# Patient Record
Sex: MALE | Race: WHITE | NOT HISPANIC OR LATINO | Employment: FULL TIME | URBAN - METROPOLITAN AREA
[De-identification: names, ages, dates, MRNs, and addresses within clinical notes are randomized per-mention and may not be internally consistent; named-entity substitution may affect disease eponyms.]

---

## 2017-02-12 ENCOUNTER — APPOINTMENT (EMERGENCY)
Dept: RADIOLOGY | Facility: HOSPITAL | Age: 62
End: 2017-02-12
Payer: COMMERCIAL

## 2017-02-12 ENCOUNTER — HOSPITAL ENCOUNTER (EMERGENCY)
Facility: HOSPITAL | Age: 62
Discharge: HOME | End: 2017-02-12
Attending: EMERGENCY MEDICINE
Payer: COMMERCIAL

## 2017-02-12 VITALS
HEIGHT: 71 IN | RESPIRATION RATE: 18 BRPM | TEMPERATURE: 98.4 F | OXYGEN SATURATION: 94 % | HEART RATE: 71 BPM | DIASTOLIC BLOOD PRESSURE: 77 MMHG | BODY MASS INDEX: 37.8 KG/M2 | WEIGHT: 270 LBS | SYSTOLIC BLOOD PRESSURE: 150 MMHG

## 2017-02-12 DIAGNOSIS — M75.20 BICEPS TENDONITIS: Primary | ICD-10-CM

## 2017-02-12 PROCEDURE — 73030 X-RAY EXAM OF SHOULDER: CPT

## 2017-02-12 PROCEDURE — 99283 EMERGENCY DEPT VISIT LOW MDM: CPT

## 2017-02-12 RX ORDER — PREDNISONE 20 MG/1
60 TABLET ORAL DAILY
Qty: 15 TABLET | Refills: 0 | Status: SHIPPED | OUTPATIENT
Start: 2017-02-12 | End: 2017-02-17

## 2017-02-12 RX ORDER — METOPROLOL SUCCINATE 100 MG/1
100 TABLET, EXTENDED RELEASE ORAL DAILY
COMMUNITY
End: 2019-06-06 | Stop reason: ALTCHOICE

## 2017-02-12 RX ORDER — METHOCARBAMOL 750 MG/1
750 TABLET, FILM COATED ORAL 3 TIMES DAILY
Qty: 15 TABLET | Refills: 0 | Status: SHIPPED | OUTPATIENT
Start: 2017-02-12 | End: 2019-06-06 | Stop reason: ALTCHOICE

## 2017-02-12 RX ORDER — PREDNISONE 20 MG/1
60 TABLET ORAL ONCE
Status: DISCONTINUED | OUTPATIENT
Start: 2017-02-12 | End: 2017-02-12 | Stop reason: HOSPADM

## 2019-05-03 RX ORDER — DIPHENOXYLATE HYDROCHLORIDE AND ATROPINE SULFATE 2.5; .025 MG/1; MG/1
1 TABLET ORAL DAILY
COMMUNITY

## 2019-05-03 RX ORDER — METOPROLOL TARTRATE 50 MG/1
TABLET, FILM COATED ORAL
COMMUNITY
End: 2019-06-06 | Stop reason: ALTCHOICE

## 2019-05-03 RX ORDER — CETIRIZINE HYDROCHLORIDE 10 MG/1
10 TABLET ORAL DAILY
COMMUNITY

## 2019-05-03 RX ORDER — LOSARTAN POTASSIUM 50 MG/1
50 TABLET ORAL
COMMUNITY
Start: 2019-03-25

## 2019-05-03 RX ORDER — KETOCONAZOLE 20 MG/ML
SHAMPOO TOPICAL
COMMUNITY

## 2019-05-03 RX ORDER — BUDESONIDE AND FORMOTEROL FUMARATE DIHYDRATE 80; 4.5 UG/1; UG/1
2 AEROSOL RESPIRATORY (INHALATION) 2 TIMES DAILY
COMMUNITY
End: 2019-06-06 | Stop reason: ALTCHOICE

## 2019-05-08 ENCOUNTER — OFFICE VISIT (OUTPATIENT)
Dept: GASTROENTEROLOGY | Facility: CLINIC | Age: 64
End: 2019-05-08
Payer: COMMERCIAL

## 2019-05-08 VITALS
BODY MASS INDEX: 35 KG/M2 | HEART RATE: 70 BPM | WEIGHT: 250 LBS | SYSTOLIC BLOOD PRESSURE: 118 MMHG | HEIGHT: 71 IN | DIASTOLIC BLOOD PRESSURE: 80 MMHG

## 2019-05-08 DIAGNOSIS — R10.10 PAIN OF UPPER ABDOMEN: Primary | ICD-10-CM

## 2019-05-08 PROCEDURE — 99214 OFFICE O/P EST MOD 30 MIN: CPT | Performed by: INTERNAL MEDICINE

## 2019-06-05 ENCOUNTER — ANESTHESIA EVENT (OUTPATIENT)
Dept: GASTROENTEROLOGY | Facility: HOSPITAL | Age: 64
End: 2019-06-05

## 2019-06-06 ENCOUNTER — ANESTHESIA (OUTPATIENT)
Dept: GASTROENTEROLOGY | Facility: HOSPITAL | Age: 64
End: 2019-06-06

## 2019-06-06 ENCOUNTER — HOSPITAL ENCOUNTER (OUTPATIENT)
Dept: GASTROENTEROLOGY | Facility: HOSPITAL | Age: 64
Setting detail: OUTPATIENT SURGERY
Discharge: HOME | End: 2019-06-06
Attending: INTERNAL MEDICINE | Admitting: INTERNAL MEDICINE
Payer: COMMERCIAL

## 2019-06-06 VITALS
HEIGHT: 71 IN | HEART RATE: 73 BPM | BODY MASS INDEX: 34.63 KG/M2 | SYSTOLIC BLOOD PRESSURE: 119 MMHG | RESPIRATION RATE: 17 BRPM | WEIGHT: 247.36 LBS | TEMPERATURE: 98.6 F | DIASTOLIC BLOOD PRESSURE: 76 MMHG | OXYGEN SATURATION: 96 %

## 2019-06-06 DIAGNOSIS — R10.13 DYSPEPSIA: ICD-10-CM

## 2019-06-06 PROCEDURE — 43235 EGD DIAGNOSTIC BRUSH WASH: CPT | Performed by: INTERNAL MEDICINE

## 2019-06-06 RX ORDER — LIDOCAINE HYDROCHLORIDE 10 MG/ML
INJECTION, SOLUTION INFILTRATION; PERINEURAL AS NEEDED
Status: DISCONTINUED | OUTPATIENT
Start: 2019-06-06 | End: 2019-06-06 | Stop reason: SURG

## 2019-06-06 RX ORDER — SODIUM CHLORIDE, SODIUM LACTATE, POTASSIUM CHLORIDE, CALCIUM CHLORIDE 600; 310; 30; 20 MG/100ML; MG/100ML; MG/100ML; MG/100ML
INJECTION, SOLUTION INTRAVENOUS CONTINUOUS PRN
Status: DISCONTINUED | OUTPATIENT
Start: 2019-06-06 | End: 2019-06-06 | Stop reason: SURG

## 2019-06-06 RX ORDER — SODIUM CHLORIDE, SODIUM LACTATE, POTASSIUM CHLORIDE, CALCIUM CHLORIDE 600; 310; 30; 20 MG/100ML; MG/100ML; MG/100ML; MG/100ML
125 INJECTION, SOLUTION INTRAVENOUS CONTINUOUS
Status: DISCONTINUED | OUTPATIENT
Start: 2019-06-06 | End: 2019-06-10 | Stop reason: HOSPADM

## 2019-06-06 RX ORDER — PROPOFOL 10 MG/ML
INJECTION, EMULSION INTRAVENOUS AS NEEDED
Status: DISCONTINUED | OUTPATIENT
Start: 2019-06-06 | End: 2019-06-06 | Stop reason: SURG

## 2019-06-06 RX ADMIN — PROPOFOL 100 MG: 10 INJECTION, EMULSION INTRAVENOUS at 11:16

## 2019-06-06 RX ADMIN — PROPOFOL 50 MG: 10 INJECTION, EMULSION INTRAVENOUS at 11:17

## 2019-06-06 RX ADMIN — SODIUM CHLORIDE, SODIUM LACTATE, POTASSIUM CHLORIDE, AND CALCIUM CHLORIDE: .6; .31; .03; .02 INJECTION, SOLUTION INTRAVENOUS at 10:54

## 2019-06-06 RX ADMIN — PROPOFOL 20 MG: 10 INJECTION, EMULSION INTRAVENOUS at 11:21

## 2019-06-06 RX ADMIN — PROPOFOL 30 MG: 10 INJECTION, EMULSION INTRAVENOUS at 11:18

## 2019-06-06 RX ADMIN — SODIUM CHLORIDE, SODIUM LACTATE, POTASSIUM CHLORIDE, AND CALCIUM CHLORIDE 125 ML/HR: .6; .31; .03; .02 INJECTION, SOLUTION INTRAVENOUS at 10:53

## 2019-06-06 RX ADMIN — LIDOCAINE HYDROCHLORIDE 50 MG: 10 INJECTION, SOLUTION INFILTRATION; PERINEURAL at 11:16

## 2019-09-15 ENCOUNTER — APPOINTMENT (EMERGENCY)
Dept: CT IMAGING | Facility: HOSPITAL | Age: 64
End: 2019-09-15
Payer: COMMERCIAL

## 2019-09-15 ENCOUNTER — HOSPITAL ENCOUNTER (EMERGENCY)
Facility: HOSPITAL | Age: 64
Discharge: HOME | End: 2019-09-15
Attending: EMERGENCY MEDICINE | Admitting: EMERGENCY MEDICINE
Payer: COMMERCIAL

## 2019-09-15 VITALS
WEIGHT: 250.66 LBS | TEMPERATURE: 98.3 F | HEIGHT: 71 IN | BODY MASS INDEX: 35.09 KG/M2 | RESPIRATION RATE: 18 BRPM | HEART RATE: 94 BPM | DIASTOLIC BLOOD PRESSURE: 76 MMHG | OXYGEN SATURATION: 94 % | SYSTOLIC BLOOD PRESSURE: 151 MMHG

## 2019-09-15 DIAGNOSIS — R10.9 ABDOMINAL PAIN: Primary | ICD-10-CM

## 2019-09-15 LAB
ALBUMIN SERPL BCP-MCNC: 3.5 G/DL (ref 3.5–5)
ALP SERPL-CCNC: 89 U/L (ref 46–116)
ALT SERPL W P-5'-P-CCNC: 65 U/L (ref 12–78)
ANION GAP SERPL CALCULATED.3IONS-SCNC: 7 MMOL/L (ref 4–13)
AST SERPL W P-5'-P-CCNC: 68 U/L (ref 5–45)
BASOPHILS # BLD AUTO: 0.05 THOUSANDS/ΜL (ref 0–0.1)
BASOPHILS NFR BLD AUTO: 1 % (ref 0–1)
BILIRUB SERPL-MCNC: 1.1 MG/DL (ref 0.2–1)
BILIRUB UR QL STRIP: NEGATIVE
BUN SERPL-MCNC: 13 MG/DL (ref 5–25)
CALCIUM SERPL-MCNC: 8.6 MG/DL (ref 8.3–10.1)
CHLORIDE SERPL-SCNC: 103 MMOL/L (ref 100–108)
CLARITY UR: CLEAR
CO2 SERPL-SCNC: 25 MMOL/L (ref 21–32)
COLOR UR: YELLOW
CREAT SERPL-MCNC: 0.8 MG/DL (ref 0.6–1.3)
EOSINOPHIL # BLD AUTO: 0.13 THOUSAND/ΜL (ref 0–0.61)
EOSINOPHIL NFR BLD AUTO: 2 % (ref 0–6)
ERYTHROCYTE [DISTWIDTH] IN BLOOD BY AUTOMATED COUNT: 12 % (ref 11.6–15.1)
GFR SERPL CREATININE-BSD FRML MDRD: 95 ML/MIN/1.73SQ M
GLUCOSE SERPL-MCNC: 140 MG/DL (ref 65–140)
GLUCOSE UR STRIP-MCNC: NEGATIVE MG/DL
HCT VFR BLD AUTO: 47.3 % (ref 36.5–49.3)
HGB BLD-MCNC: 16.3 G/DL (ref 12–17)
HGB UR QL STRIP.AUTO: NEGATIVE
HOLD SPECIMEN: NORMAL
IMM GRANULOCYTES # BLD AUTO: 0.03 THOUSAND/UL (ref 0–0.2)
IMM GRANULOCYTES NFR BLD AUTO: 0 % (ref 0–2)
KETONES UR STRIP-MCNC: NEGATIVE MG/DL
LEUKOCYTE ESTERASE UR QL STRIP: NEGATIVE
LIPASE SERPL-CCNC: 61 U/L (ref 73–393)
LYMPHOCYTES # BLD AUTO: 2.06 THOUSANDS/ΜL (ref 0.6–4.47)
LYMPHOCYTES NFR BLD AUTO: 25 % (ref 14–44)
MCH RBC QN AUTO: 30.4 PG (ref 26.8–34.3)
MCHC RBC AUTO-ENTMCNC: 34.5 G/DL (ref 31.4–37.4)
MCV RBC AUTO: 88 FL (ref 82–98)
MONOCYTES # BLD AUTO: 0.82 THOUSAND/ΜL (ref 0.17–1.22)
MONOCYTES NFR BLD AUTO: 10 % (ref 4–12)
NEUTROPHILS # BLD AUTO: 5.1 THOUSANDS/ΜL (ref 1.85–7.62)
NEUTS SEG NFR BLD AUTO: 62 % (ref 43–75)
NITRITE UR QL STRIP: NEGATIVE
NRBC BLD AUTO-RTO: 0 /100 WBCS
PH UR STRIP.AUTO: 6 [PH]
PLATELET # BLD AUTO: 189 THOUSANDS/UL (ref 149–390)
PMV BLD AUTO: 10.6 FL (ref 8.9–12.7)
POTASSIUM SERPL-SCNC: 5.1 MMOL/L (ref 3.5–5.3)
PROT SERPL-MCNC: 6.9 G/DL (ref 6.4–8.2)
PROT UR STRIP-MCNC: NEGATIVE MG/DL
RBC # BLD AUTO: 5.37 MILLION/UL (ref 3.88–5.62)
SODIUM SERPL-SCNC: 135 MMOL/L (ref 136–145)
SP GR UR STRIP.AUTO: 1.01 (ref 1–1.03)
UROBILINOGEN UR QL STRIP.AUTO: 0.2 E.U./DL
WBC # BLD AUTO: 8.19 THOUSAND/UL (ref 4.31–10.16)

## 2019-09-15 PROCEDURE — 80053 COMPREHEN METABOLIC PANEL: CPT | Performed by: EMERGENCY MEDICINE

## 2019-09-15 PROCEDURE — 99284 EMERGENCY DEPT VISIT MOD MDM: CPT | Performed by: EMERGENCY MEDICINE

## 2019-09-15 PROCEDURE — 36415 COLL VENOUS BLD VENIPUNCTURE: CPT | Performed by: EMERGENCY MEDICINE

## 2019-09-15 PROCEDURE — 99284 EMERGENCY DEPT VISIT MOD MDM: CPT

## 2019-09-15 PROCEDURE — 85025 COMPLETE CBC W/AUTO DIFF WBC: CPT | Performed by: EMERGENCY MEDICINE

## 2019-09-15 PROCEDURE — 74176 CT ABD & PELVIS W/O CONTRAST: CPT

## 2019-09-15 PROCEDURE — 83690 ASSAY OF LIPASE: CPT | Performed by: EMERGENCY MEDICINE

## 2019-09-15 PROCEDURE — 81003 URINALYSIS AUTO W/O SCOPE: CPT | Performed by: EMERGENCY MEDICINE

## 2019-09-15 RX ORDER — MAGNESIUM HYDROXIDE/ALUMINUM HYDROXICE/SIMETHICONE 120; 1200; 1200 MG/30ML; MG/30ML; MG/30ML
30 SUSPENSION ORAL ONCE
Status: COMPLETED | OUTPATIENT
Start: 2019-09-15 | End: 2019-09-15

## 2019-09-15 RX ORDER — FAMOTIDINE 40 MG/1
40 TABLET, FILM COATED ORAL DAILY
Qty: 21 TABLET | Refills: 0 | Status: SHIPPED | OUTPATIENT
Start: 2019-09-15 | End: 2021-03-24

## 2019-09-15 RX ORDER — LIDOCAINE HYDROCHLORIDE 20 MG/ML
15 SOLUTION OROPHARYNGEAL ONCE
Status: COMPLETED | OUTPATIENT
Start: 2019-09-15 | End: 2019-09-15

## 2019-09-15 RX ORDER — ONDANSETRON 4 MG/1
4 TABLET, ORALLY DISINTEGRATING ORAL EVERY 8 HOURS PRN
Qty: 12 TABLET | Refills: 0 | Status: SHIPPED | OUTPATIENT
Start: 2019-09-15 | End: 2021-03-24

## 2019-09-15 RX ADMIN — ALUMINUM HYDROXIDE, MAGNESIUM HYDROXIDE, AND SIMETHICONE 30 ML: 200; 200; 20 SUSPENSION ORAL at 15:15

## 2019-09-15 RX ADMIN — IOHEXOL 50 ML: 240 INJECTION, SOLUTION INTRATHECAL; INTRAVASCULAR; INTRAVENOUS; ORAL at 16:52

## 2019-09-15 RX ADMIN — LIDOCAINE HYDROCHLORIDE 15 ML: 20 SOLUTION ORAL; TOPICAL at 15:15

## 2019-09-15 NOTE — DISCHARGE INSTRUCTIONS
Please return immediately if your symptoms return or suddenly worsening and do not go away you developed other concerning symptoms otherwise please follow up your family doctor for re-evaluation as instructed as discussed

## 2019-09-15 NOTE — ED PROVIDER NOTES
History  Chief Complaint   Patient presents with    Abdominal Pain     mostly left sided, onset yesterday     81-year-old male with a history of pancreatic mass status post resection and Whipple 2 years ago here for evaluation of abdominal pain  Patient reports approximately 14 hours ago he developed burning pain in a discomfort in his left lower quadrant is nonradiating he notes that he believes is worse with food, has been waxing and waning since last night he is here for further evaluation of the symptoms  He denies history of similar they are area of concern is left lower quadrant only is not his left upper quadrant, he reports that it is improved with water animal is gone at this point but is still persists again no other exacerbating remitting factors he has no other associated symptoms with this including recent fevers viral symptoms headache chest pain cough shortness of breath he has no nausea vomiting anorexia no flank pain urinary symptoms he notes no change in bowels joint pain swelling rashes other associated symptoms  Complete review systems otherwise negative as noted          Prior to Admission Medications   Prescriptions Last Dose Informant Patient Reported? Taking?    BIOTIN PO  Self Yes No   Sig: Take by mouth   NIACINAMIDE ER PO  Self Yes No   Sig: Take 1,500 mg by mouth daily   Omega-3 Fatty Acids (FISH OIL OMEGA-3 PO)  Self Yes No   Sig: Take 1 g by mouth daily   aspirin 81 MG tablet  Self Yes No   Sig: Take 81 mg by mouth daily   cetirizine (ZyrTEC) 10 mg tablet  Self Yes No   Sig: Take 10 mg by mouth daily   diphenhydrAMINE-APAP, sleep, (DIPHENHYDRAMINE-APAP PO)  Self Yes No   Sig: Take 1 capsule by mouth 1 hour before exam    hydrocortisone 2 5 % cream  Self Yes No   Sig: hydrocortisone 2 5 % topical cream   ketoconazole (NIZORAL) 2 % shampoo  Self Yes No   Sig: ketoconazole 2 % shampoo   losartan (COZAAR) 50 mg tablet  Self Yes No   Sig: Take 50 mg by mouth   multivitamin (THERAGRAN) TABS  Self Yes No   Sig: Take 1 tablet by mouth daily   pancrelipase, Lip-Prot-Amyl, (CREON) 24,000 units  Self Yes No   Sig: TAKE 2 CAPSULES BY MOUTH 3 (THREE) TIMES A DAY WITH MEALS  Facility-Administered Medications: None       Past Medical History:   Diagnosis Date    Hypertension        Past Surgical History:   Procedure Laterality Date    ABDOMINAL SURGERY      APPENDECTOMY      COLONOSCOPY      2018    FACIAL RECONSTRUCTION SURGERY      OTHER SURGICAL HISTORY      skin cancer removal    TONSILLECTOMY      WHIPPLE PROCEDURE W/ LAPAROSCOPY         Family History   Problem Relation Age of Onset    Hypertension Mother     Heart disease Mother     Osteoarthritis Mother     Cancer Father     Throat cancer Father      I have reviewed and agree with the history as documented  Social History     Tobacco Use    Smoking status: Former Smoker    Smokeless tobacco: Never Used    Tobacco comment: quit 25 years ago   Substance Use Topics    Alcohol use: No    Drug use: No        Review of Systems   Constitutional: Negative for activity change, appetite change, chills and fever  HENT: Negative for rhinorrhea and sore throat  Respiratory: Negative for cough and shortness of breath  Cardiovascular: Negative for chest pain and palpitations  Gastrointestinal: Positive for abdominal pain  Negative for abdominal distention, constipation, diarrhea, nausea, rectal pain and vomiting  Genitourinary: Negative for dysuria, frequency, scrotal swelling, testicular pain and urgency  Musculoskeletal: Negative for arthralgias, back pain and myalgias  Skin: Negative for color change and rash  Neurological: Negative for dizziness, weakness, light-headedness and headaches  Hematological: Negative for adenopathy  Does not bruise/bleed easily  Psychiatric/Behavioral: Negative for agitation and behavioral problems  All other systems reviewed and are negative        Physical Exam  Physical Exam Constitutional: He is oriented to person, place, and time  He appears well-developed and well-nourished  No distress  Clinically very well-appearing pleasant conversational no acute distress   HENT:   Head: Normocephalic and atraumatic  Eyes: Pupils are equal, round, and reactive to light  EOM are normal    Neck: Normal range of motion  Neck supple  No tracheal deviation present  Cardiovascular: Normal rate, regular rhythm and normal heart sounds  Exam reveals no gallop and no friction rub  No murmur heard  Pulmonary/Chest: Effort normal and breath sounds normal  He has no wheezes  He has no rales  Abdominal: Soft  Bowel sounds are normal  He exhibits no distension  There is tenderness  There is no rebound and no guarding  Minimal tenderness in his left mid abdomen no rebound or guarding no discrete lower abdominal tenderness no upper abdominal tenderness his no flank or CVA tenderness   Musculoskeletal: Normal range of motion  He exhibits no edema or tenderness  Neurological: He is alert and oriented to person, place, and time  No cranial nerve deficit  He exhibits normal muscle tone  Coordination normal    Skin: Skin is warm and dry  No rash noted  Psychiatric: He has a normal mood and affect  His behavior is normal    Nursing note and vitals reviewed        Vital Signs  ED Triage Vitals   Temperature Pulse Respirations Blood Pressure SpO2   09/15/19 1426 09/15/19 1426 09/15/19 1426 09/15/19 1426 09/15/19 1426   98 3 °F (36 8 °C) 83 16 144/86 99 %      Temp Source Heart Rate Source Patient Position - Orthostatic VS BP Location FiO2 (%)   09/15/19 1426 09/15/19 1426 09/15/19 1646 09/15/19 1426 --   Oral Monitor Lying Right arm       Pain Score       09/15/19 1426       Worst Possible Pain           Vitals:    09/15/19 1426 09/15/19 1646 09/15/19 1826   BP: 144/86 126/90 151/76   Pulse: 83 75 94   Patient Position - Orthostatic VS:  Lying          Visual Acuity      ED Medications  Medications aluminum-magnesium hydroxide-simethicone (MYLANTA) 200-200-20 mg/5 mL oral suspension 30 mL (30 mL Oral Given 9/15/19 1515)   Lidocaine Viscous HCl (XYLOCAINE) 2 % mucosal solution 15 mL (15 mL Swish & Spit Given 9/15/19 1515)   iohexol (OMNIPAQUE) 240 MG/ML solution 50 mL (50 mL Oral Given 9/15/19 1652)       Diagnostic Studies  Results Reviewed     Procedure Component Value Units Date/Time    Comprehensive metabolic panel [[id number]]  (Abnormal) Collected:  09/15/19 1510    Lab Status:  Final result Specimen:  Blood from Arm, Right Updated:  09/15/19 1545     Sodium 135 mmol/L      Potassium 5 1 mmol/L      Chloride 103 mmol/L      CO2 25 mmol/L      ANION GAP 7 mmol/L      BUN 13 mg/dL      Creatinine 0 80 mg/dL      Glucose 140 mg/dL      Calcium 8 6 mg/dL      AST 68 U/L      ALT 65 U/L      Alkaline Phosphatase 89 U/L      Total Protein 6 9 g/dL      Albumin 3 5 g/dL      Total Bilirubin 1 10 mg/dL      eGFR 95 ml/min/1 73sq m     Narrative:       [place] guidelines for Chronic Kidney Disease (CKD):     Stage 1 with normal or high GFR (GFR > 90 mL/min/1 73 square meters)    Stage 2 Mild CKD (GFR = 60-89 mL/min/1 73 square meters)    Stage 3A Moderate CKD (GFR = 45-59 mL/min/1 73 square meters)    Stage 3B Moderate CKD (GFR = 30-44 mL/min/1 73 square meters)    Stage 4 Severe CKD (GFR = 15-29 mL/min/1 73 square meters)    Stage 5 End Stage CKD (GFR <15 mL/min/1 73 square meters)  Note: GFR calculation is accurate only with a steady state creatinine    Lipase [[id number]]  (Abnormal) Collected:  09/15/19 1510    Lab Status:  Final result Specimen:  Blood from Arm, Right Updated:  09/15/19 1545     Lipase 61 u/L     UA w Reflex to Microscopic w Reflex to Culture [[id number]] Collected:  09/15/19 1530    Lab Status:  Final result Specimen:  Urine, Clean Catch Updated:  09/15/19 1541     Color, UA Yellow     Clarity, UA Clear     Specific Gravity, UA 1 010     pH, UA 6 0 Leukocytes, UA Negative     Nitrite, UA Negative     Protein, UA Negative mg/dl      Glucose, UA Negative mg/dl      Ketones, UA Negative mg/dl      Urobilinogen, UA 0 2 E U /dl      Bilirubin, UA Negative     Blood, UA Negative    CBC and differential [[id number]] Collected:  09/15/19 1510    Lab Status:  Final result Specimen:  Blood from Arm, Right Updated:  09/15/19 1520     WBC 8 19 Thousand/uL      RBC 5 37 Million/uL      Hemoglobin 16 3 g/dL      Hematocrit 47 3 %      MCV 88 fL      MCH 30 4 pg      MCHC 34 5 g/dL      RDW 12 0 %      MPV 10 6 fL      Platelets 029 Thousands/uL      nRBC 0 /100 WBCs      Neutrophils Relative 62 %      Immat GRANS % 0 %      Lymphocytes Relative 25 %      Monocytes Relative 10 %      Eosinophils Relative 2 %      Basophils Relative 1 %      Neutrophils Absolute 5 10 Thousands/µL      Immature Grans Absolute 0 03 Thousand/uL      Lymphocytes Absolute 2 06 Thousands/µL      Monocytes Absolute 0 82 Thousand/µL      Eosinophils Absolute 0 13 Thousand/µL      Basophils Absolute 0 05 Thousands/µL                  CT abdomen pelvis wo contrast   Final Result by Mynor Kauffman MD (09/15 1746)      No acute abnormality to account for left lower quadrant pain  Colonic diverticulosis without evidence of acute diverticulitis  Hepatic steatosis  Postoperative changes status post Whipple procedure with expected mild pneumobilia  Main pancreatic duct is dilated and contains significant amount of air which is presumed to be postoperative  Correlation with prior outside studies will be helpful                 Workstation performed: [id number]                    Procedures  Procedures       ED Course  ED Course as of Sep 15 2144   Sanaz Fox Sep 15, 2019   1809 AST(!): 68   1814 Patient's symptoms resolved without return of symptoms, after further discussion was made better by water and drinking, there are no other suspicious findings on his CT scan he does have some postoperative changes without anything to compare to he has left lower quadrant discomfort now resolved there is no right upper quadrant pain or upper abdominal pain or anything in the area of his Whipple his LFTs are not significantly elevated reviewed with patient family will discharge with symptom control very close return follow-up instructions patient family agreeable plan                                  MDM    Disposition  Final diagnoses:   Abdominal pain     Time reflects when diagnosis was documented in both MDM as applicable and the Disposition within this note     Time User Action Codes Description Comment    9/15/2019  6:15 PM Talib Mccullough Add [R10 9] Abdominal pain       ED Disposition     ED Disposition Condition Date/Time Comment    Discharge Stable Sun Sep 15, 2019  6:15 PM [place] discharge to home/self care              Follow-up Information     Follow up With Specialties Details Why Contact Info Additional Information    [address] [place] [address] Emergency Department Emergency Medicine  If symptoms worsen [facility] [address] ED, [address], [place], [address] [place] [address], [address] [place] [address] Nurse Practitioner, Family Medicine In 1 week  [address] [place] [address]  [phone]             Discharge Medication List as of 9/15/2019  6:17 PM      START taking these medications    Details   famotidine (PEPCID) 40 MG tablet Take 1 tablet (40 mg total) by mouth daily for 21 days, Starting Sun 9/15/2019, Until Sun 10/6/2019, Print      ondansetron (ZOFRAN-ODT) 4 mg disintegrating tablet Take 1 tablet (4 mg total) by mouth every 8 (eight) hours as needed for nausea for up to 3 days, Starting Sun 9/15/2019, Until Wed 9/18/2019, Print         CONTINUE these medications which have NOT CHANGED    Details   aspirin 81 MG tablet Take 81 mg by mouth daily, Historical Med      BIOTIN PO Take by mouth, Historical Med      cetirizine (ZyrTEC) 10 mg tablet Take 10 mg by mouth daily, Historical Med      diphenhydrAMINE-APAP, sleep, (DIPHENHYDRAMINE-APAP PO) Take 1 capsule by mouth 1 hour before exam , Historical Med      hydrocortisone 2 5 % cream hydrocortisone 2 5 % topical cream, Historical Med      ketoconazole (NIZORAL) 2 % shampoo ketoconazole 2 % shampoo, Historical Med      losartan (COZAAR) 50 mg tablet Take 50 mg by mouth, Starting Mon 3/25/2019, Historical Med      multivitamin (THERAGRAN) TABS Take 1 tablet by mouth daily, Historical Med      NIACINAMIDE ER PO Take 1,500 mg by mouth daily, Historical Med      Omega-3 Fatty Acids (FISH OIL OMEGA-3 PO) Take 1 g by mouth daily, Starting Mon 4/15/2019, Until Tue 4/14/2020, Historical Med      pancrelipase, Lip-Prot-Amyl, (CREON) 24,000 units TAKE 2 CAPSULES BY MOUTH 3 (THREE) TIMES A DAY WITH MEALS , Historical Med           No discharge procedures on file      ED Provider  Electronically Signed by           Srekeanth Cunningham DO  09/15/19 8375

## 2020-01-16 NOTE — PROGRESS NOTES
Assessment and plan:       1  BPH with lower urinary tract symptoms  - Patient demonstrates a slightly elevated PVR of 59 mL today  - AUA symptom score is 22 with an overall bother score of 5    - We discussed pharmacotherapy as well as bladder outlet obstruction procedures at his appointment today  He was given patient education documentation on the Urolift procedure but would like to start with pharmacotherapy which is very reasonable  - He is provided with a prescription of tamsulosin  Dosing instructions and side effect profile were reviewed  2  Erectile dysfunction  - Patient was provided with a prescription of 20 mg sildenafil  He was educated that he can take up to 5 tabs but due to the fact that he is able to achieve some erectile function on his own, he may not need this much  - Side effect profile and emergency department precautions were reviewed  3  Prostate cancer screening  - Recent PSA was 0 61  - Prostate examination today was benign    He will return approximately 6 weeks for symptom reassessment  Lisseth Holloway PA-C      Chief Complaint     Chief Complaint   Patient presents with    Urinary Incontinence         History of Present Illness     Ce Keenan is a 62 y o  male patient re-establishing care with [facility] for Urology for urinary incontinence  He was last seen by our service on 09/22/2016 by Dr Serjio Marie  At that time he has suffered from a single episode of uncomplicated urethritis with spontaneous resolution  Patient is not on any medications for his prostate or bladder  He denies any family history of prostate issues or prostate cancer  Patient reports that he does not utilize a pad for an incontinence but often has to rush to the bathroom and barely makes it  He did have dribbling more recently prior to reaching the restroom  He also reports some erectile dysfunction    He does continue to have occasional morning erections but does have trouble both achieving and maintaining an erection  He has noted some burning with ejaculation  He is concerned about a decreased amount of ejaculate as well  He is not currently trying to conceive children  He does report 5-6 episodes of "terrible" perineal pain lasting 5-6 minutes at a time over the last 12 months  He did have a PSA drawn 01/09/2020 that was 0 61  He shows slightly elevated PVR today of 59 mL  AUA symptom score is 22 with an overall bother score of 5  Laboratory     Lab Results   Component Value Date    CREATININE 0 80 09/15/2019       No results found for: PSA    Recent Results (from the past 1 hour(s))   POCT Measure PVR    Collection Time: 01/21/20  1:23 PM   Result Value Ref Range    POST-VOID RESIDUAL VOLUME, ML POC 59 mL         Review of Systems     Review of Systems   Constitutional: Negative for chills and fever  HENT: Negative  Eyes: Negative  Respiratory: Negative for shortness of breath  Cardiovascular: Negative for chest pain  Gastrointestinal: Negative for constipation, diarrhea, nausea and vomiting  Genitourinary: Positive for frequency and urgency  Negative for difficulty urinating, dysuria, enuresis, flank pain and hematuria  Musculoskeletal: Negative  AUA SYMPTOM SCORE      Most Recent Value   AUA SYMPTOM SCORE   How often have you had a sensation of not emptying your bladder completely after you finished urinating? 1   How often have you had to urinate again less than two hours after you finished urinating? 5   How often have you found you stopped and started again several times when you urinate? 4   How often have you found it difficult to postpone urination? 4   How often have you had a weak urinary stream?  3   How often have you had to push or strain to begin urination? 4   How many times did you most typically get up to urinate from the time you went to bed at night until the time you got up in the morning?   1   Quality of Life: If you were to spend the rest of your life with your urinary condition just the way it is now, how would you feel about that?  5   AUA SYMPTOM SCORE  22                    Allergies     Allergies   Allergen Reactions    Iodinated Casein Hives and Itching    Iodine Hives    Shellfish-Derived Products Hives and Anaphylaxis    Glucosamine Hives    Penicillins Other (See Comments)     Rt  Shoulder was frozen       Physical Exam     Physical Exam   Constitutional: He is oriented to person, place, and time  He appears well-developed and well-nourished  No distress  HENT:   Head: Normocephalic and atraumatic  Right Ear: External ear normal    Left Ear: External ear normal    Nose: Nose normal    Eyes: Right eye exhibits no discharge  Left eye exhibits no discharge  No scleral icterus  Cardiovascular: Normal rate  Pulmonary/Chest: Effort normal    Genitourinary:   Genitourinary Comments: Prostate reveals approximately 25 g gland that is smooth, nontender, and without nodules  Musculoskeletal:   Ambulates independently   Neurological: He is alert and oriented to person, place, and time  Skin: Skin is warm and dry  He is not diaphoretic  Psychiatric: He has a normal mood and affect  His behavior is normal  Judgment and thought content normal    Nursing note and vitals reviewed        Vital Signs     Vitals:    01/21/20 1316   BP: 140/90   BP Location: Right arm   Patient Position: Sitting   Cuff Size: Standard   Pulse: 105   Weight: 113 kg (250 lb)   Height: 5' 11" (1 803 m)         Current Medications       Current Outpatient Medications:     aspirin 81 MG tablet, Take 81 mg by mouth daily, Disp: , Rfl:     BIOTIN PO, Take by mouth, Disp: , Rfl:     cetirizine (ZyrTEC) 10 mg tablet, Take 10 mg by mouth daily, Disp: , Rfl:     diphenhydrAMINE-APAP, sleep, (DIPHENHYDRAMINE-APAP PO), Take 1 capsule by mouth 1 hour before exam , Disp: , Rfl:     famotidine (PEPCID) 40 MG tablet, Take 1 tablet (40 mg total) by mouth daily for 21 days, Disp: 21 tablet, Rfl: 0    hydrocortisone 2 5 % cream, hydrocortisone 2 5 % topical cream, Disp: , Rfl:     ketoconazole (NIZORAL) 2 % shampoo, ketoconazole 2 % shampoo, Disp: , Rfl:     losartan (COZAAR) 50 mg tablet, Take 50 mg by mouth, Disp: , Rfl:     multivitamin (THERAGRAN) TABS, Take 1 tablet by mouth daily, Disp: , Rfl:     NIACINAMIDE ER PO, Take 1,500 mg by mouth daily, Disp: , Rfl:     Omega-3 Fatty Acids (FISH OIL OMEGA-3 PO), Take 1 g by mouth daily, Disp: , Rfl:     ondansetron (ZOFRAN-ODT) 4 mg disintegrating tablet, Take 1 tablet (4 mg total) by mouth every 8 (eight) hours as needed for nausea for up to 3 days, Disp: 12 tablet, Rfl: 0    pancrelipase, Lip-Prot-Amyl, (CREON) 24,000 units, TAKE 2 CAPSULES BY MOUTH 3 (THREE) TIMES A DAY WITH MEALS , Disp: , Rfl:       Active Problems     There is no problem list on file for this patient          Past Medical History     Past Medical History:   Diagnosis Date    Hypertension          Surgical History     Past Surgical History:   Procedure Laterality Date    ABDOMINAL SURGERY      APPENDECTOMY      COLONOSCOPY      2018    FACIAL RECONSTRUCTION SURGERY      OTHER SURGICAL HISTORY      skin cancer removal    TONSILLECTOMY      WHIPPLE PROCEDURE W/ LAPAROSCOPY           Family History     Family History   Problem Relation Age of Onset    Hypertension Mother     Heart disease Mother     Osteoarthritis Mother     Cancer Father     Throat cancer Father          Social History     Social History       Radiology

## 2020-01-21 ENCOUNTER — OFFICE VISIT (OUTPATIENT)
Dept: UROLOGY | Facility: CLINIC | Age: 65
End: 2020-01-21
Payer: COMMERCIAL

## 2020-01-21 VITALS
DIASTOLIC BLOOD PRESSURE: 90 MMHG | WEIGHT: 250 LBS | BODY MASS INDEX: 35 KG/M2 | HEIGHT: 71 IN | SYSTOLIC BLOOD PRESSURE: 140 MMHG | HEART RATE: 105 BPM

## 2020-01-21 DIAGNOSIS — N52.9 ERECTILE DYSFUNCTION, UNSPECIFIED ERECTILE DYSFUNCTION TYPE: ICD-10-CM

## 2020-01-21 DIAGNOSIS — R32 URINARY INCONTINENCE, UNSPECIFIED TYPE: Primary | ICD-10-CM

## 2020-01-21 LAB — POST-VOID RESIDUAL VOLUME, ML POC: 59 ML

## 2020-01-21 PROCEDURE — 51798 US URINE CAPACITY MEASURE: CPT | Performed by: PHYSICIAN ASSISTANT

## 2020-01-21 PROCEDURE — 99244 OFF/OP CNSLTJ NEW/EST MOD 40: CPT | Performed by: PHYSICIAN ASSISTANT

## 2020-01-21 RX ORDER — SILDENAFIL CITRATE 20 MG/1
20 TABLET ORAL AS NEEDED
Qty: 30 TABLET | Refills: 11 | Status: SHIPPED | OUTPATIENT
Start: 2020-01-21 | End: 2021-04-28 | Stop reason: SDUPTHER

## 2020-01-21 RX ORDER — TAMSULOSIN HYDROCHLORIDE 0.4 MG/1
0.4 CAPSULE ORAL
Qty: 90 CAPSULE | Refills: 3 | Status: SHIPPED | OUTPATIENT
Start: 2020-01-21 | End: 2021-03-23

## 2020-03-04 NOTE — PROGRESS NOTES
Assessment and plan:       1  BPH with lower urinary tract symptoms  - Patient demonstrates a stable PVR today of 70 mL  - AUA symptom score improved from 22 with an overall bother score of 5 to 15 with an overall bother score of 3    - At this time he would like to continue pharmacotherapy and defer any procedural options      2  Erectile dysfunction  - Patient has tried different doses of sildenafil since his last appointment  He is able to achieve a sufficient erection with 40 mg     3  Prostate cancer screening  - Recent PSA was 0 61  - Prostate examination at consultation    He will return in 10 months for routine prostate cancer screening and symptom reassessment  Sunil Villanueva PA-C      Chief Complaint     Chief Complaint   Patient presents with    Urinary incontinence, unspecified type         History of Present Illness     Latasha Gilbert is a 62 y o  male patient known to our service for erectile dysfunction and lower urinary tract symptoms  When he was recently seen he had an AUA symptom score of 22 with an overall bother score of 5  Patient was recently initiated on tamsulosin and returns today for symptom reassessment  He was also provided with a prescription for sildenafil  AUA symptom score has improved mildly and is now 15 with an overall bother score of 3  Patient's bladder emptying is unchanged at 70 mL  He does report using 40-80 mg of sildenafil as needed  He does notice bothersome nasal congestion with this medication  He was encouraged to take the lowest dose possible with sufficient effect  He is bothered by retrograde ejaculation      Laboratory     Lab Results   Component Value Date    CREATININE 0 80 09/15/2019       No results found for: PSA    Recent Results (from the past 1 hour(s))   POCT Measure PVR    Collection Time: 03/09/20  1:34 PM   Result Value Ref Range    POST-VOID RESIDUAL VOLUME, ML POC 70 mL         Review of Systems     Review of Systems Constitutional: Negative for chills and fever  HENT: Negative  Eyes: Negative  Respiratory: Negative for shortness of breath  Cardiovascular: Negative for chest pain  Gastrointestinal: Negative for constipation, diarrhea, nausea and vomiting  Genitourinary: Positive for frequency and urgency  Negative for difficulty urinating, dysuria, enuresis, flank pain and hematuria  Musculoskeletal: Negative  AUA SYMPTOM SCORE      Most Recent Value   AUA SYMPTOM SCORE   How often have you had a sensation of not emptying your bladder completely after you finished urinating? 2   How often have you had to urinate again less than two hours after you finished urinating? 4   How often have you found you stopped and started again several times when you urinate? 2   How often have you found it difficult to postpone urination? 0   How often have you had a weak urinary stream?  2   How often have you had to push or strain to begin urination? 3   How many times did you most typically get up to urinate from the time you went to bed at night until the time you got up in the morning? 2   Quality of Life: If you were to spend the rest of your life with your urinary condition just the way it is now, how would you feel about that?  3   AUA SYMPTOM SCORE  15                  Allergies     Allergies   Allergen Reactions    Iodinated Casein Hives and Itching    Iodine Hives    Shellfish-Derived Products Hives and Anaphylaxis    Glucosamine Hives    Penicillins Other (See Comments)     Rt  Shoulder was frozen       Physical Exam     Physical Exam   Constitutional: He is oriented to person, place, and time  He appears well-developed and well-nourished  No distress  HENT:   Head: Normocephalic and atraumatic  Right Ear: External ear normal    Left Ear: External ear normal    Nose: Nose normal    Eyes: Right eye exhibits no discharge  Left eye exhibits no discharge  No scleral icterus     Cardiovascular: Normal rate    Pulmonary/Chest: Effort normal    Musculoskeletal:   Ambulates independently   Neurological: He is alert and oriented to person, place, and time  Skin: Skin is warm and dry  He is not diaphoretic  Psychiatric: He has a normal mood and affect  His behavior is normal  Judgment and thought content normal    Nursing note and vitals reviewed          Vital Signs     Vitals:    03/09/20 1326   BP: 140/80   BP Location: Left arm   Patient Position: Sitting   Cuff Size: Standard   Pulse: 88   Weight: 118 kg (261 lb)   Height: 5' 11" (1 803 m)         Current Medications       Current Outpatient Medications:     aspirin 81 MG tablet, Take 81 mg by mouth daily, Disp: , Rfl:     BIOTIN PO, Take by mouth, Disp: , Rfl:     cetirizine (ZyrTEC) 10 mg tablet, Take 10 mg by mouth daily, Disp: , Rfl:     diphenhydrAMINE-APAP, sleep, (DIPHENHYDRAMINE-APAP PO), Take 1 capsule by mouth 1 hour before exam , Disp: , Rfl:     hydrocortisone 2 5 % cream, hydrocortisone 2 5 % topical cream, Disp: , Rfl:     ketoconazole (NIZORAL) 2 % shampoo, ketoconazole 2 % shampoo, Disp: , Rfl:     losartan (COZAAR) 50 mg tablet, Take 50 mg by mouth, Disp: , Rfl:     multivitamin (THERAGRAN) TABS, Take 1 tablet by mouth daily, Disp: , Rfl:     NIACINAMIDE ER PO, Take 1,500 mg by mouth daily, Disp: , Rfl:     Omega-3 Fatty Acids (FISH OIL OMEGA-3 PO), Take 1 g by mouth daily, Disp: , Rfl:     pancrelipase, Lip-Prot-Amyl, (CREON) 24,000 units, TAKE 2 CAPSULES BY MOUTH 3 (THREE) TIMES A DAY WITH MEALS , Disp: , Rfl:     sildenafil (REVATIO) 20 mg tablet, Take 1 tablet (20 mg total) by mouth as needed (erectile dysfunction) Take 2-4 tablets as needed, Disp: 30 tablet, Rfl: 11    tamsulosin (FLOMAX) 0 4 mg, Take 1 capsule (0 4 mg total) by mouth daily with dinner, Disp: 90 capsule, Rfl: 3    famotidine (PEPCID) 40 MG tablet, Take 1 tablet (40 mg total) by mouth daily for 21 days, Disp: 21 tablet, Rfl: 0    ondansetron (ZOFRAN-ODT) 4 mg disintegrating tablet, Take 1 tablet (4 mg total) by mouth every 8 (eight) hours as needed for nausea for up to 3 days, Disp: 12 tablet, Rfl: 0      Active Problems     There is no problem list on file for this patient          Past Medical History     Past Medical History:   Diagnosis Date    Hypertension          Surgical History     Past Surgical History:   Procedure Laterality Date    ABDOMINAL SURGERY      APPENDECTOMY      COLONOSCOPY      2018    FACIAL RECONSTRUCTION SURGERY      OTHER SURGICAL HISTORY      skin cancer removal    TONSILLECTOMY      WHIPPLE PROCEDURE W/ LAPAROSCOPY           Family History     Family History   Problem Relation Age of Onset    Hypertension Mother     Heart disease Mother     Osteoarthritis Mother     Cancer Father     Throat cancer Father          Social History     Social History       Radiology

## 2020-03-09 ENCOUNTER — OFFICE VISIT (OUTPATIENT)
Dept: UROLOGY | Facility: CLINIC | Age: 65
End: 2020-03-09
Payer: COMMERCIAL

## 2020-03-09 VITALS
BODY MASS INDEX: 36.54 KG/M2 | DIASTOLIC BLOOD PRESSURE: 80 MMHG | HEIGHT: 71 IN | SYSTOLIC BLOOD PRESSURE: 140 MMHG | HEART RATE: 88 BPM | WEIGHT: 261 LBS

## 2020-03-09 DIAGNOSIS — R32 URINARY INCONTINENCE, UNSPECIFIED TYPE: Primary | ICD-10-CM

## 2020-03-09 DIAGNOSIS — Z12.5 PROSTATE CANCER SCREENING: ICD-10-CM

## 2020-03-09 LAB — POST-VOID RESIDUAL VOLUME, ML POC: 70 ML

## 2020-03-09 PROCEDURE — 99213 OFFICE O/P EST LOW 20 MIN: CPT | Performed by: PHYSICIAN ASSISTANT

## 2020-03-09 PROCEDURE — 51798 US URINE CAPACITY MEASURE: CPT | Performed by: PHYSICIAN ASSISTANT

## 2021-03-04 ENCOUNTER — APPOINTMENT (OUTPATIENT)
Dept: LAB | Facility: CLINIC | Age: 66
End: 2021-03-04
Payer: COMMERCIAL

## 2021-03-04 DIAGNOSIS — Z12.5 PROSTATE CANCER SCREENING: ICD-10-CM

## 2021-03-04 LAB — PSA SERPL-MCNC: 0.6 NG/ML (ref 0–4)

## 2021-03-04 PROCEDURE — 36415 COLL VENOUS BLD VENIPUNCTURE: CPT

## 2021-03-04 PROCEDURE — G0103 PSA SCREENING: HCPCS

## 2021-03-11 ENCOUNTER — TELEPHONE (OUTPATIENT)
Dept: UROLOGY | Facility: CLINIC | Age: 66
End: 2021-03-11

## 2021-03-11 ENCOUNTER — OFFICE VISIT (OUTPATIENT)
Dept: UROLOGY | Facility: CLINIC | Age: 66
End: 2021-03-11
Payer: COMMERCIAL

## 2021-03-11 VITALS
WEIGHT: 263 LBS | SYSTOLIC BLOOD PRESSURE: 118 MMHG | BODY MASS INDEX: 36.82 KG/M2 | HEIGHT: 71 IN | DIASTOLIC BLOOD PRESSURE: 76 MMHG | HEART RATE: 85 BPM

## 2021-03-11 DIAGNOSIS — R32 URINARY INCONTINENCE, UNSPECIFIED TYPE: Primary | ICD-10-CM

## 2021-03-11 LAB — POST-VOID RESIDUAL VOLUME, ML POC: 57 ML

## 2021-03-11 PROCEDURE — 51798 US URINE CAPACITY MEASURE: CPT | Performed by: PHYSICIAN ASSISTANT

## 2021-03-11 PROCEDURE — 99213 OFFICE O/P EST LOW 20 MIN: CPT | Performed by: PHYSICIAN ASSISTANT

## 2021-03-11 NOTE — PROGRESS NOTES
1  Urinary incontinence, unspecified type  POCT Measure PVR         Assessment and plan:       1  BPH with lower urinary tract symptoms  -  ongoing lower urinary tract symptoms  Has had adverse side effects to the tamsulosin and has since discontinued  Already has baseline erectile dysfunction would like to avoid finasteride  Will have patient scheduled for cystoscopy and transrectal ultrasound to evaluate if he is a candidate for bladder outlet obstruction intervention  2  Erectile dysfunction  - Patient has tried different doses of sildenafil since his last appointment  He is able to achieve a sufficient erection with 40 mg     3  Prostate cancer screening  -  PSA 0 6 with normal GENEVA      Elvis Wilde PA-C      Chief Complaint       Lower urinary tract symptoms    History of Present Illness     Nitza Meza is a 60 y o  male patient known to our service for erectile dysfunction and lower urinary tract symptoms  Patient had previously been started on tamsulosin daily for his lower urinary tract symptoms  Did report some bothersome retrograde ejaculation  Patient ultimately discontinued his tamsulosin a few months ago due to significant nasal congestion  He has ongoing urinary symptoms which he finds very bothersome  Denies any dysuria, gross hematuria, suprapubic pressure, flank pain, fevers, or chills  He does report using 40-80 mg of sildenafil as needed  He was encouraged to take the lowest dose possible with sufficient effect  Patient's most recent PSA is 0 6 ( 03/04/2021)  PVR 57  AUA SYMPTOM SCORE      Most Recent Value   AUA SYMPTOM SCORE   How often have you had a sensation of not emptying your bladder completely after you finished urinating? 2   How often have you had to urinate again less than two hours after you finished urinating? 3   How often have you found you stopped and started again several times when you urinate?   2   How often have you found it difficult to postpone urination? 5   How often have you had a weak urinary stream?  2   How often have you had to push or strain to begin urination? 1   How many times did you most typically get up to urinate from the time you went to bed at night until the time you got up in the morning? 4   Quality of Life: If you were to spend the rest of your life with your urinary condition just the way it is now, how would you feel about that?  3   AUA SYMPTOM SCORE  19          Laboratory     Lab Results   Component Value Date    CREATININE 0 80 09/15/2019       Lab Results   Component Value Date    PSA 0 6 03/04/2021       No results found for this or any previous visit (from the past 1 hour(s))  Review of Systems     Review of Systems   Constitutional: Negative for chills and fever  HENT: Negative  Eyes: Negative  Respiratory: Negative for shortness of breath  Cardiovascular: Negative for chest pain  Gastrointestinal: Negative for constipation, diarrhea, nausea and vomiting  Genitourinary: Positive for frequency and urgency  Negative for difficulty urinating, dysuria, enuresis, flank pain and hematuria  Musculoskeletal: Negative  Allergies     Allergies   Allergen Reactions    Iodinated Casein Hives and Itching    Iodine Hives    Shellfish-Derived Products Hives and Anaphylaxis    Glucosamine Hives    Penicillins Other (See Comments)     Rt  Shoulder was frozen       Physical Exam     Physical Exam  Vitals signs and nursing note reviewed  Constitutional:       General: He is not in acute distress  Appearance: He is well-developed  He is not diaphoretic  HENT:      Head: Normocephalic and atraumatic  Right Ear: External ear normal       Left Ear: External ear normal       Nose: Nose normal    Eyes:      General: No scleral icterus  Right eye: No discharge  Left eye: No discharge  Cardiovascular:      Rate and Rhythm: Normal rate     Pulmonary:      Effort: Pulmonary effort is normal    Genitourinary:     Comments: Good rectal tone,  Prostate 40g,  No nodules  Musculoskeletal:      Comments: Ambulates independently   Skin:     General: Skin is warm and dry  Neurological:      Mental Status: He is alert and oriented to person, place, and time  Psychiatric:         Behavior: Behavior normal          Thought Content:  Thought content normal          Judgment: Judgment normal            Vital Signs     Vitals:    03/11/21 0814   BP: 118/76   Pulse: 85   Weight: 119 kg (263 lb)   Height: 5' 11" (1 803 m)         Current Medications       Current Outpatient Medications:     aspirin 81 MG tablet, Take 81 mg by mouth daily, Disp: , Rfl:     BIOTIN PO, Take by mouth, Disp: , Rfl:     cetirizine (ZyrTEC) 10 mg tablet, Take 10 mg by mouth daily, Disp: , Rfl:     diphenhydrAMINE-APAP, sleep, (DIPHENHYDRAMINE-APAP PO), Take 1 capsule by mouth 1 hour before exam , Disp: , Rfl:     hydrocortisone 2 5 % cream, hydrocortisone 2 5 % topical cream, Disp: , Rfl:     ketoconazole (NIZORAL) 2 % shampoo, ketoconazole 2 % shampoo, Disp: , Rfl:     losartan (COZAAR) 50 mg tablet, Take 50 mg by mouth, Disp: , Rfl:     multivitamin (THERAGRAN) TABS, Take 1 tablet by mouth daily, Disp: , Rfl:     NIACINAMIDE ER PO, Take 1,500 mg by mouth daily, Disp: , Rfl:     pancrelipase, Lip-Prot-Amyl, (CREON) 24,000 units, TAKE 2 CAPSULES BY MOUTH 3 (THREE) TIMES A DAY WITH MEALS , Disp: , Rfl:     sildenafil (REVATIO) 20 mg tablet, Take 1 tablet (20 mg total) by mouth as needed (erectile dysfunction) Take 2-4 tablets as needed, Disp: 30 tablet, Rfl: 11    tamsulosin (FLOMAX) 0 4 mg, Take 1 capsule (0 4 mg total) by mouth daily with dinner, Disp: 90 capsule, Rfl: 3    famotidine (PEPCID) 40 MG tablet, Take 1 tablet (40 mg total) by mouth daily for 21 days, Disp: 21 tablet, Rfl: 0    ondansetron (ZOFRAN-ODT) 4 mg disintegrating tablet, Take 1 tablet (4 mg total) by mouth every 8 (eight) hours as needed for nausea for up to 3 days, Disp: 12 tablet, Rfl: 0      Active Problems     There is no problem list on file for this patient          Past Medical History     Past Medical History:   Diagnosis Date    Hypertension          Surgical History     Past Surgical History:   Procedure Laterality Date    ABDOMINAL SURGERY      APPENDECTOMY      COLONOSCOPY      2018    FACIAL RECONSTRUCTION SURGERY      OTHER SURGICAL HISTORY      skin cancer removal    TONSILLECTOMY      WHIPPLE PROCEDURE W/ LAPAROSCOPY           Family History     Family History   Problem Relation Age of Onset    Hypertension Mother     Heart disease Mother     Osteoarthritis Mother     Cancer Father     Throat cancer Father          Social History     Social History       Radiology

## 2021-03-23 PROBLEM — Z71.2 PERSON CONSULTING FOR EXPLANATION OF EXAMINATION OR TEST FINDING: Status: ACTIVE | Noted: 2021-03-23

## 2021-03-23 PROBLEM — N52.01 ERECTILE DYSFUNCTION DUE TO ARTERIAL INSUFFICIENCY: Status: ACTIVE | Noted: 2021-03-23

## 2021-03-23 PROBLEM — N40.1 BENIGN LOCALIZED PROSTATIC HYPERPLASIA WITH LOWER URINARY TRACT SYMPTOMS (LUTS): Status: ACTIVE | Noted: 2021-03-23

## 2021-03-23 NOTE — PROGRESS NOTES
Problem List Items Addressed This Visit        Cardiovascular and Mediastinum    Erectile dysfunction due to arterial insufficiency      Patient is taking sildenafil as needed for some baseline erectile dysfunction  For this reason finasteride has been avoid in the past   Furthermore his prostate only measures around 24 grams, as such he would get limited benefit from finasteride  He can continue phosphodiesterase 5 inhibition at this time            Genitourinary    Benign localized prostatic hyperplasia with lower urinary tract symptoms (LUTS) - Primary      Patient's complaints are mostly nocturnal   His AUA symptom score is 19 with a bother score of 3, during the day he does also have some slow urinary stream and some sensation of incomplete emptying, although his PVR is normal at 29 milliliters  Main complaint is nocturia, up to 3-4 times per night, he does have a history of having poor sleep, he typically goes to bed around 01:30 and wakes up around 6 or 7, it is been this way his entire life, does not take excessive caffeine  Has not had a sleep study  Nocturia may be multifactorial, his cystoscopic workup today does show a high and tight bladder neck and a prostate measuring 24 grams  The best surgical approach for him would be a transurethral incision of the bladder neck and prostate, I gave him information on this procedure  If he wishes to schedule he will call us to let us know      Otherwise, he will follow up in 6 months         Relevant Orders    POCT Measure PVR (Completed)       Other    Person consulting for explanation of examination or test finding      Real-time review of transrectal ultrasound and cystoscopic findings with the patient, all questions and concerns answered and addressed                 I discussed with Serafin Quintanilla the pre, harish, and postoperative care for transurethral incision of the bladder neck and prostate, I gave him information about this, he will discuss this with his wife  I did tell him that men undergoing such procedures will often have a decrease in nocturia of 1-2 voids per night, and rarely have complete resolution of their nocturia  I did disclose to him that there are some men in whom we  Perform this surgery who continue with the same number of nocturnal voids, however    Assessment and plan:        Please see problem oriented charting for the assessment plan of today's urological complaints    Caroline Orr MD      Chief Complaint     Chief Complaint   Patient presents with    Urinary incontinence, unspecified type    Cystoscopy     trus         History of Present Illness     Sunita Mcdonough is a 60 y o  Man with erectile dysfunction, also with significant lower urinary tract symptoms, wishes to avoid finasteride due to erectile dysfunction, had bothersome side effects from tamsulosin  Previous AUA symptom score was 19/3, today's AUA symptom score is  This same    New complaints include  Nocturia 3-4 times per night, no aggravating or alleviating factors, does not take excessive nighttime fluids, has not had a sleep study, unsure if he has sleep apnea or snoring    PVR is 29 milliliters, indicating complete bladder emptying     The following portions of the patient's history were reviewed and updated as appropriate: allergies, current medications, past family history, past medical history, past social history, past surgical history and problem list         Detailed Urologic History     - please refer to HPI    Review of Systems     Review of Systems   Constitutional: Negative  HENT: Negative  Eyes: Negative  Respiratory: Negative  Cardiovascular: Negative  Gastrointestinal: Negative  Endocrine: Negative  Genitourinary: Positive for difficulty urinating, frequency and urgency  Musculoskeletal: Negative  Skin: Negative  Allergic/Immunologic: Negative  Neurological: Negative  Hematological: Negative  Psychiatric/Behavioral: Negative  Allergies     Allergies   Allergen Reactions    Iodine Hives    Shellfish-Derived Products Hives and Anaphylaxis    Glucosamine Hives    Penicillins Other (See Comments)     Rt  Shoulder was frozen       Physical Exam     Physical Exam  Vitals signs reviewed  Constitutional:       General: He is not in acute distress  Appearance: Normal appearance  He is obese  He is not ill-appearing, toxic-appearing or diaphoretic  HENT:      Head: Normocephalic and atraumatic  Eyes:      General: No scleral icterus  Right eye: No discharge  Left eye: No discharge  Cardiovascular:      Pulses: Normal pulses  Pulmonary:      Effort: Pulmonary effort is normal    Abdominal:      General: There is no distension  Palpations: There is no mass  Comments: Well-healed midline incision, patient reports a Whipple procedure previously, bilateral inguinal hernia scars, relatively fresh, well healed, no recurrent hernia   Genitourinary:     Penis: Normal     Musculoskeletal:         General: No swelling  Skin:     General: Skin is warm  Neurological:      General: No focal deficit present  Mental Status: He is alert and oriented to person, place, and time  Cranial Nerves: No cranial nerve deficit  Sensory: No sensory deficit  Psychiatric:         Mood and Affect: Mood normal          Behavior: Behavior normal          Thought Content:  Thought content normal          Judgment: Judgment normal              Vital Signs  Vitals:    03/24/21 0745   BP: 146/88   Pulse: (!) 122   Weight: 117 kg (258 lb)   Height: 5' 11" (1 803 m)         Current Medications       Current Outpatient Medications:     aspirin 81 MG tablet, Take 81 mg by mouth daily, Disp: , Rfl:     BIOTIN PO, Take by mouth, Disp: , Rfl:     cetirizine (ZyrTEC) 10 mg tablet, Take 10 mg by mouth daily, Disp: , Rfl:     diphenhydrAMINE-APAP, sleep, (DIPHENHYDRAMINE-APAP PO), Take 1 capsule by mouth 1 hour before exam , Disp: , Rfl:     hydrocortisone 2 5 % cream, hydrocortisone 2 5 % topical cream, Disp: , Rfl:     ketoconazole (NIZORAL) 2 % shampoo, ketoconazole 2 % shampoo, Disp: , Rfl:     losartan (COZAAR) 50 mg tablet, Take 50 mg by mouth, Disp: , Rfl:     multivitamin (THERAGRAN) TABS, Take 1 tablet by mouth daily, Disp: , Rfl:     NIACINAMIDE ER PO, Take 1,500 mg by mouth daily, Disp: , Rfl:     pancrelipase, Lip-Prot-Amyl, (CREON) 24,000 units, TAKE 2 CAPSULES BY MOUTH 3 (THREE) TIMES A DAY WITH MEALS , Disp: , Rfl:     sildenafil (REVATIO) 20 mg tablet, Take 1 tablet (20 mg total) by mouth as needed (erectile dysfunction) Take 2-4 tablets as needed, Disp: 30 tablet, Rfl: 11      Active Problems     Patient Active Problem List   Diagnosis    Erectile dysfunction due to arterial insufficiency    Benign localized prostatic hyperplasia with lower urinary tract symptoms (LUTS)    Person consulting for explanation of examination or test finding         Past Medical History     Past Medical History:   Diagnosis Date    Hypertension          Surgical History     Past Surgical History:   Procedure Laterality Date    ABDOMINAL SURGERY      APPENDECTOMY      COLONOSCOPY      2018    FACIAL RECONSTRUCTION SURGERY      OTHER SURGICAL HISTORY      skin cancer removal    TONSILLECTOMY      WHIPPLE PROCEDURE W/ LAPAROSCOPY           Family History     Family History   Problem Relation Age of Onset    Hypertension Mother     Heart disease Mother     Osteoarthritis Mother     Cancer Father     Throat cancer Father          Social History     Social History     Social History     Tobacco Use   Smoking Status Former Smoker   Smokeless Tobacco Never Used   Tobacco Comment    quit 25 years ago         Pertinent Lab Values     Lab Results   Component Value Date    CREATININE 0 80 09/15/2019       Lab Results   Component Value Date    PSA 0 6 03/04/2021 Pertinent Imaging       Real-time review of transrectal ultrasound

## 2021-03-23 NOTE — PATIENT INSTRUCTIONS
Decision Aid for Benign Prostatic Hyperplasia   WHAT YOU NEED TO KNOW:     David Lopez,    The proposed procedure for you is called a transurethral incision of the prostate or a (TUIP)  You can read more about this online and use YouTube to watch this procedure if you like  If you do wish to schedule please call me and I will place these orders for you  I suspect that this will help decrease your work of emptying your bladder and also potentially help with decreasing your getting up at night     -Dr Laury Castro    What do I need to know about decisions for benign prostatic hyperplasia (BPH)? You can work with your healthcare provider to make decisions about being screened or treated for BPH  Screening is a test done to find BPH early  Screening is different from diagnosis  Screening is used if you are at increased risk for BPH but do not have symptoms  This means management or treatment can start early  You can also help plan treatment if BPH is found with screening, or you develop it later on  Your treatment choices include nonsurgical options and surgery  Your healthcare provider may recommend nonsurgical treatments first  Learn about the benefits and risks of nonsurgical treatment and surgery so you can make an informed choice  What do I need to know about BPH?   · BPH is an enlarged prostate  The prostate is normally the size of a walnut and wraps around the urethra  An enlarged prostate will press on the urethra  This may cause problems with storing urine or emptying your bladder completely  · BPH is common in men older than 40 years  The risk increases with age  · Benign means it is not cancer  BPH is not a life-threatening condition, but it can cause problems with your daily activities  BPH usually gets worse over time  Left untreated, BPH can also lead to blood in your urine, bladder stones, or kidney failure  Am I a good candidate for BPH screening?   Screening may be helpful for you if any of the following is true:  · You are 40 years or older  · You have a family history of BPH or other prostate problems  · You have a medical condition such as a heart disease or type 2 diabetes, or you are obese  · You do not get much physical activity  · You want to have treatment as early as possible if needed  · You take a beta-blocker medicine  How is screening done? Your healthcare provider will ask about your medical history and family history of prostate problems  If you are at increased risk, your provider may use any of the following to check for BPH:  · The International Prostate Symptom Score  is a set of questions about your ability to urinate over the past month  You will be asked how often you have any of the following:     ? Urinating 8 or more times each day    ? A feeling of not fully emptying your bladder when you urinate    ? An urgent need to urinate that you could not put off, or urinating again within 2 hours    ? Being woken from sleep because you needed to urinate    ? Trouble starting your urine flow, or a need to push or strain to get it to start    ? Urine that stops and starts several times when you urinate    ? A weak urine stream, or dribbling after you urinate    · A digital rectal exam  is used to check the size of your prostate  Your healthcare provider will insert a gloved finger into your rectum  The provider will be able to feel your prostate  The exam may be repeated over time to check the prostate size  · A PSA test  is used to measure the amount of a protein made by your prostate gland  A blood sample is taken for this test  A high PSA level can increase your risk for more severe urination problems or the need for surgery  What are the benefits and risks of screening? Talk with your healthcare provider about the risks and benefits of screening:  · Benefits  include finding BPH early  This means you can make more decisions about treatments   The PSA test can also find prostate cancer early  Treatment of prostate cancer is more successful when it starts early  · Risks  include a false belief that you will not develop BPH if your screening result is negative  Even though your screening result is negative, you may still develop it later on  You may also need more tests if you have problems urinating but screening shows you do not have BPH  What questions should I ask my healthcare provider to help me make decisions about screening? · How high is my risk for BPH? · How often do I need to have screening? · Where is the screening done? · Do I need to do anything to get ready to have screening? What happens after I have screening? You will meet with your healthcare provider to go over the results of your screening  You may need more tests to diagnose anything that showed up on the screening test  Common tests include a urine test to check for an infection or a biopsy (tissue sample) to check for cancer  You may also need tests to measure the amount of urine left in your bladder after you urinate  The force of your urine flow may also be measured  You and your healthcare provider can talk about your treatment options  Together you can decide which treatment is right for you  How is BPH treated, and what are the benefits of treatment? · Watchful waiting  means you do not receive treatment right away  Your signs and symptoms will be monitored over time to see if they get worse  You may be asked to keep a record  The record will include when you urinate, how easy or difficult it was, and any changes in urination  You will bring the record to follow-up visits  Your healthcare provider may also recommend ways to improve your symptoms during watchful waiting  · Medicines  may be given to help your symptoms and to prevent BPH from getting worse  Medicines may help relax certain muscles to make it easier for you to urinate   You may also need medicine to make your prostate smaller or to relieve an overactive bladder  Medicines may start to relieve your symptoms quickly  Medicines can improve your quality of life  You may also be able to manage your symptoms without surgery if medicine keeps your BPH from getting worse  · A procedure  may be used to place a stent into your urethra to hold it open  A stent is a short, tiny mesh tube  A prostatic urethral lift, or UroLift, may be used to hold the prostate away from the urethra  This makes the urethra wider so urine can pass through more easily  · Surgery  may be used to relieve your symptoms if other treatments do not work  An ablation is surgery that uses a needle to destroy extra tissue that is causing your symptoms  A laser may instead be used to destroy the tissue  Your prostate may be heated  A tool that gives off heat is inserted into your urethra  Prostate tissue is destroyed, and no other tissues are damaged  Parts of your prostate may be removed during another type of surgery  What are the risks of BPH treatment? · Watchful waiting  may allow BPH to get worse and be more difficult to treat than at an early stage  If you have a high PSA level, you may need to have BPH treated right away  · Medicines  can cause certain side effects, such as erectile dysfunction (ED) or a lowered sex drive  You may also develop urinary retention (trouble starting your urine to flow)  Some medicines can cause hypotension (low blood pressure) when you stand, or dizziness  · Surgery  can damage tissue around your prostate  Surgery can also increase your risk for trouble urinating, incontinence (leaking), or urinary retention  You may bleed more than expected during surgery or develop an infection  You may also need to be treated again if the surgery you have does not relieve your symptoms  What questions should I ask my provider to help me make decisions about treatment?    · How will I feel if I continue to have these symptoms for the rest of my life? · Which medicines may work best to treat my symptoms? · What other steps can I take to decrease my symptoms? · Will I have to take medicine for the rest of my life? · What side effects might happen with each medicine I can try? · Am I a good candidate for surgery? · Which surgery may work best to treat my symptoms? · Where is the surgery done? · How long is recovery after surgery? · What are the possible side effects of surgery? CARE AGREEMENT:   You have the right to help plan your care  Learn about your health condition and how it may be treated  Discuss treatment options with your healthcare providers to decide what care you want to receive  You always have the right to refuse treatment  The above information is an  only  It is not intended as medical advice for individual conditions or treatments  Talk to your doctor, nurse or pharmacist before following any medical regimen to see if it is safe and effective for you  © Copyright [address] Information is for End User's use only and may not be sold, redistributed or otherwise used for commercial purposes   All illustrations and images included in CareNotes® are the copyrighted property of A D A M , [employer]  or [address] [place] [address] [employer] CommunicationsB[facility]

## 2021-03-24 ENCOUNTER — PROCEDURE VISIT (OUTPATIENT)
Dept: UROLOGY | Facility: CLINIC | Age: 66
End: 2021-03-24
Payer: COMMERCIAL

## 2021-03-24 VITALS
WEIGHT: 258 LBS | HEIGHT: 71 IN | BODY MASS INDEX: 36.12 KG/M2 | HEART RATE: 122 BPM | DIASTOLIC BLOOD PRESSURE: 88 MMHG | SYSTOLIC BLOOD PRESSURE: 146 MMHG

## 2021-03-24 DIAGNOSIS — N40.1 BENIGN LOCALIZED PROSTATIC HYPERPLASIA WITH LOWER URINARY TRACT SYMPTOMS (LUTS): Primary | ICD-10-CM

## 2021-03-24 DIAGNOSIS — N52.01 ERECTILE DYSFUNCTION DUE TO ARTERIAL INSUFFICIENCY: ICD-10-CM

## 2021-03-24 DIAGNOSIS — Z71.2 PERSON CONSULTING FOR EXPLANATION OF EXAMINATION OR TEST FINDING: ICD-10-CM

## 2021-03-24 LAB — POST-VOID RESIDUAL VOLUME, ML POC: 29 ML

## 2021-03-24 PROCEDURE — 51798 US URINE CAPACITY MEASURE: CPT | Performed by: UROLOGY

## 2021-03-24 PROCEDURE — 52000 CYSTOURETHROSCOPY: CPT | Performed by: UROLOGY

## 2021-03-24 PROCEDURE — 99214 OFFICE O/P EST MOD 30 MIN: CPT | Performed by: UROLOGY

## 2021-03-24 PROCEDURE — 76872 US TRANSRECTAL: CPT | Performed by: UROLOGY

## 2021-03-24 NOTE — PROGRESS NOTES
Office Cystoscopy Procedure Note    Indication:     medically refractory lower urinary tract symptoms     Informed consent   The risks, benefits, complications, treatment options, and expected outcomes were discussed with the patient  The patient concurred with the proposed plan and provided informed consent  Anesthesia  Lidocaine jelly 2%    Antibiotic prophylaxis   None    Procedure  The patient was placed in the supineposition, was prepped and draped in the usual manner using sterile technique, and 2% lidocaine jelly instilled into the urethra  A 17 F flexible cystoscope was then inserted into the urethra and the urethra and bladder carefully examined  The following findings were noted:    Findings:  Urethra:  No stricture, intact sphincter complex  Prostate:   some mild lateral lobe hypertrophy, high tone within the prostatic urethra, elevated and tight bladder neck  Bladder:   no trabeculation, no lesions, no malignant findings  Ureteral orifices:   orthotopic, clear urine exiting  Other findings:   none, retroflexed view confirms    Specimens: None                 Complications:    None; patient tolerated the procedure well           Disposition: To home            Condition: Stable    Plan:  cystoscopy shows bladder outlet obstruction due to a high and tight prostate and bladder neck, the procedure of choice in this gentleman's case would be a transurethral incision of the prostate and bladder neck  He does not wish to schedule this today, he will call us back if he decides upon surgery  Cystoscopy     Date/Time 3/24/2021 8:22 AM     Performed by  Isidro Rosenberg MD     Authorized by Isidro Rosenberg MD      Universal Protocol:  Consent: Verbal consent obtained  Written consent obtained    Risks and benefits: risks, benefits and alternatives were discussed  Consent given by: patient  Patient understanding: patient states understanding of the procedure being performed  Patient consent: the patient's understanding of the procedure matches consent given  Procedure consent: procedure consent matches procedure scheduled  Relevant documents: relevant documents present and verified  Test results: test results available and properly labeled  Site marked: the operative site was not marked  Radiology Images displayed and confirmed  If images not available, report reviewed: imaging studies available  Required items: required blood products, implants, devices, and special equipment available  Patient identity confirmed: verbally with patient and provided demographic data        Procedure Details:  Procedure type: cystoscopy    Patient tolerance: Patient tolerated the procedure well with no immediate complications    Additional Procedure Details: Office Cystoscopy Procedure Note    Indication:     medically refractory lower urinary tract symptoms     Informed consent   The risks, benefits, complications, treatment options, and expected outcomes were discussed with the patient  The patient concurred with the proposed plan and provided informed consent  Anesthesia  Lidocaine jelly 2%    Antibiotic prophylaxis   None    Procedure  The patient was placed in the supineposition, was prepped and draped in the usual manner using sterile technique, and 2% lidocaine jelly instilled into the urethra  A 17 F flexible cystoscope was then inserted into the urethra and the urethra and bladder carefully examined    The following findings were noted:    Findings:  Urethra:  No stricture, intact sphincter complex  Prostate:   some mild lateral lobe hypertrophy, high tone within the prostatic urethra, elevated and tight bladder neck  Bladder:   no trabeculation, no lesions, no malignant findings  Ureteral orifices:   orthotopic, clear urine exiting  Other findings:   none, retroflexed view confirms    Specimens: None                 Complications:    None; patient tolerated the procedure well           Disposition: To home Condition: Stable    Plan:  cystoscopy shows bladder outlet obstruction due to a high and tight prostate and bladder neck, the procedure of choice in this gentleman's case would be a transurethral incision of the prostate and bladder neck  He does not wish to schedule this today, he will call us back if he decides upon surgery

## 2021-03-24 NOTE — ASSESSMENT & PLAN NOTE
Real-time review of transrectal ultrasound and cystoscopic findings with the patient, all questions and concerns answered and addressed

## 2021-03-24 NOTE — PROGRESS NOTES
Office TRUS    Indication    Medically refractory lower urinary tract symptoms    Informed consent   The risks, benefits and alternatives to TRUS Were discussed with the patient, informed consent was obtained  Transrectal ultrasonography  The patient was placed in the left lateral decubitus position  After an attentive digital rectal examination, a 7 5 mHz sidefire ultrasound probe was gently inserted into the rectum and biplanar imaging of the prostate was done with the findings noted below  Images were taken of any abnormal findings and also to document prostate size  Bladder  The bladder base appeared normal     Prostate      Ultrasound size measurements:  -Volume:  24 34 cm3    Ultrasound findings:  -Cysts: None  -Masses: None  -Median lobe: absent    elevation of the bladder neck is noted                  Complications  There were no procedural complications  Disposition  The patient was dismissed to home     Post-procedure instructions: Today he underwent an uncomplicated transrectal ultrasound  Showing a relatively mildly enlarged prostate however with a high and tight bladder neck, best procedure would be a transurethral incision of the prostate for this patient  Biopsy prostate     Date/Time 3/24/2021 8:24 AM     Performed by  Tanvi Senior MD     Authorized by Tanvi Senior MD      Universal Protocol   Consent: Verbal consent obtained  Written consent obtained    Risks and benefits: risks, benefits and alternatives were discussed  Consent given by: patient  Patient understanding: patient states understanding of the procedure being performed  Patient consent: the patient's understanding of the procedure matches consent given  Procedure consent: procedure consent matches procedure scheduled  Relevant documents: relevant documents present and verified  Test results: test results available and properly labeled  Site marked: the operative site was not marked  Radiology Images displayed and confirmed  If images not available, report reviewed: imaging studies available  Required items: required blood products, implants, devices, and special equipment available  Patient identity confirmed: verbally with patient and provided demographic data        Local anesthesia used: no     Anesthesia   Local anesthesia used: no     Sedation   Patient sedated: no        Specimen: no    Culture: no   Procedure Details   Procedure Notes: Please note that only the transrectal ultrasound component was performed today:     Office TRUS    Indication    Medically refractory lower urinary tract symptoms    Informed consent   The risks, benefits and alternatives to TRUS Were discussed with the patient, informed consent was obtained  Transrectal ultrasonography  The patient was placed in the left lateral decubitus position  After an attentive digital rectal examination, a 7 5 mHz sidefire ultrasound probe was gently inserted into the rectum and biplanar imaging of the prostate was done with the findings noted below  Images were taken of any abnormal findings and also to document prostate size  Bladder  The bladder base appeared normal     Prostate      Ultrasound size measurements:  -Volume:  24 34 cm3    Ultrasound findings:  -Cysts: None  -Masses: None  -Median lobe: absent    elevation of the bladder neck is noted                  Complications  There were no procedural complications  Disposition  The patient was dismissed to home     Post-procedure instructions: Today he underwent an uncomplicated transrectal ultrasound  Showing a relatively mildly enlarged prostate however with a high and tight bladder neck, best procedure would be a transurethral incision of the prostate for this patient      Patient Transportation: confirmed  Patient tolerance: patient tolerated the procedure well with no immediate complications

## 2021-03-24 NOTE — ASSESSMENT & PLAN NOTE
Patient's complaints are mostly nocturnal   His AUA symptom score is 19 with a bother score of 3, during the day he does also have some slow urinary stream and some sensation of incomplete emptying, although his PVR is normal at 29 milliliters  Main complaint is nocturia, up to 3-4 times per night, he does have a history of having poor sleep, he typically goes to bed around 01:30 and wakes up around 6 or 7, it is been this way his entire life, does not take excessive caffeine  Has not had a sleep study  Nocturia may be multifactorial, his cystoscopic workup today does show a high and tight bladder neck and a prostate measuring 24 grams  The best surgical approach for him would be a transurethral incision of the bladder neck and prostate, I gave him information on this procedure  If he wishes to schedule he will call us to let us know      Otherwise, he will follow up in 6 months

## 2021-03-24 NOTE — ASSESSMENT & PLAN NOTE
Patient is taking sildenafil as needed for some baseline erectile dysfunction  For this reason finasteride has been avoid in the past   Furthermore his prostate only measures around 24 grams, as such he would get limited benefit from finasteride        He can continue phosphodiesterase 5 inhibition at this time

## 2021-03-24 NOTE — LETTER
March 24, 2021     Mackenzie Charles MD  [address] [place] [address]    Patient: Ryder Preciado   YOB: 1955   Date of Visit: 3/24/2021       Dear Dr Alberto Patience: Thank you for referring Ryder Preciado to me for evaluation  Below are my notes for this consultation  If you have questions, please do not hesitate to call me  I look forward to following your patient along with you  Sincerely,        Vanessa Wilson MD        CC: KALLIE Ramachandran MD  3/24/2021  8:25 AM  Sign when Signing Visit  Office TRUS    Indication    Medically refractory lower urinary tract symptoms    Informed consent   The risks, benefits and alternatives to TRUS Were discussed with the patient, informed consent was obtained  Transrectal ultrasonography  The patient was placed in the left lateral decubitus position  After an attentive digital rectal examination, a 7 5 mHz sidefire ultrasound probe was gently inserted into the rectum and biplanar imaging of the prostate was done with the findings noted below  Images were taken of any abnormal findings and also to document prostate size  Bladder  The bladder base appeared normal     Prostate      Ultrasound size measurements:  -Volume:  24 34 cm3    Ultrasound findings:  -Cysts: None  -Masses: None  -Median lobe: absent    elevation of the bladder neck is noted                  Complications  There were no procedural complications  Disposition  The patient was dismissed to home     Post-procedure instructions: Today he underwent an uncomplicated transrectal ultrasound  Showing a relatively mildly enlarged prostate however with a high and tight bladder neck, best procedure would be a transurethral incision of the prostate for this patient              Biopsy prostate     Date/Time 3/24/2021 8:24 AM     Performed by  Vanessa Wilson MD     Authorized by Vanessa Wilson MD      Universal Protocol   Consent: Verbal consent obtained  Written consent obtained  Risks and benefits: risks, benefits and alternatives were discussed  Consent given by: patient  Patient understanding: patient states understanding of the procedure being performed  Patient consent: the patient's understanding of the procedure matches consent given  Procedure consent: procedure consent matches procedure scheduled  Relevant documents: relevant documents present and verified  Test results: test results available and properly labeled  Site marked: the operative site was not marked  Radiology Images displayed and confirmed  If images not available, report reviewed: imaging studies available  Required items: required blood products, implants, devices, and special equipment available  Patient identity confirmed: verbally with patient and provided demographic data        Local anesthesia used: no     Anesthesia   Local anesthesia used: no     Sedation   Patient sedated: no        Specimen: no    Culture: no   Procedure Details   Procedure Notes: Please note that only the transrectal ultrasound component was performed today:     Office TRUS    Indication    Medically refractory lower urinary tract symptoms    Informed consent   The risks, benefits and alternatives to TRUS Were discussed with the patient, informed consent was obtained  Transrectal ultrasonography  The patient was placed in the left lateral decubitus position  After an attentive digital rectal examination, a 7 5 mHz sidefire ultrasound probe was gently inserted into the rectum and biplanar imaging of the prostate was done with the findings noted below  Images were taken of any abnormal findings and also to document prostate size      Bladder  The bladder base appeared normal     Prostate      Ultrasound size measurements:  -Volume:  24 34 cm3    Ultrasound findings:  -Cysts: None  -Masses: None  -Median lobe: absent    elevation of the bladder neck is noted                  Complications  There were no procedural complications  Disposition  The patient was dismissed to home     Post-procedure instructions: Today he underwent an uncomplicated transrectal ultrasound  Showing a relatively mildly enlarged prostate however with a high and tight bladder neck, best procedure would be a transurethral incision of the prostate for this patient  Patient Transportation: confirmed  Patient tolerance: patient tolerated the procedure well with no immediate complications                       Heriberto Mathew MD  3/24/2021  8:22 AM  Sign when Signing Visit  Office Cystoscopy Procedure Note    Indication:     medically refractory lower urinary tract symptoms     Informed consent   The risks, benefits, complications, treatment options, and expected outcomes were discussed with the patient  The patient concurred with the proposed plan and provided informed consent  Anesthesia  Lidocaine jelly 2%    Antibiotic prophylaxis   None    Procedure  The patient was placed in the supineposition, was prepped and draped in the usual manner using sterile technique, and 2% lidocaine jelly instilled into the urethra  A 17 F flexible cystoscope was then inserted into the urethra and the urethra and bladder carefully examined    The following findings were noted:    Findings:  Urethra:  No stricture, intact sphincter complex  Prostate:   some mild lateral lobe hypertrophy, high tone within the prostatic urethra, elevated and tight bladder neck  Bladder:   no trabeculation, no lesions, no malignant findings  Ureteral orifices:   orthotopic, clear urine exiting  Other findings:   none, retroflexed view confirms    Specimens: None                 Complications:    None; patient tolerated the procedure well           Disposition: To home            Condition: Stable    Plan:  cystoscopy shows bladder outlet obstruction due to a high and tight prostate and bladder neck, the procedure of choice in this gentleman's case would be a transurethral incision of the prostate and bladder neck  He does not wish to schedule this today, he will call us back if he decides upon surgery  Cystoscopy     Date/Time 3/24/2021 8:22 AM     Performed by  Alessio Jiménez MD     Authorized by Alessio Jiménez MD      Universal Protocol:  Consent: Verbal consent obtained  Written consent obtained  Risks and benefits: risks, benefits and alternatives were discussed  Consent given by: patient  Patient understanding: patient states understanding of the procedure being performed  Patient consent: the patient's understanding of the procedure matches consent given  Procedure consent: procedure consent matches procedure scheduled  Relevant documents: relevant documents present and verified  Test results: test results available and properly labeled  Site marked: the operative site was not marked  Radiology Images displayed and confirmed  If images not available, report reviewed: imaging studies available  Required items: required blood products, implants, devices, and special equipment available  Patient identity confirmed: verbally with patient and provided demographic data        Procedure Details:  Procedure type: cystoscopy    Patient tolerance: Patient tolerated the procedure well with no immediate complications    Additional Procedure Details: Office Cystoscopy Procedure Note    Indication:     medically refractory lower urinary tract symptoms     Informed consent   The risks, benefits, complications, treatment options, and expected outcomes were discussed with the patient  The patient concurred with the proposed plan and provided informed consent  Anesthesia  Lidocaine jelly 2%    Antibiotic prophylaxis   None    Procedure  The patient was placed in the supineposition, was prepped and draped in the usual manner using sterile technique, and 2% lidocaine jelly instilled into the urethra    A 17 F flexible cystoscope was then inserted into the urethra and the urethra and bladder carefully examined  The following findings were noted:    Findings:  Urethra:  No stricture, intact sphincter complex  Prostate:   some mild lateral lobe hypertrophy, high tone within the prostatic urethra, elevated and tight bladder neck  Bladder:   no trabeculation, no lesions, no malignant findings  Ureteral orifices:   orthotopic, clear urine exiting  Other findings:   none, retroflexed view confirms    Specimens: None                 Complications:    None; patient tolerated the procedure well           Disposition: To home            Condition: Stable    Plan:  cystoscopy shows bladder outlet obstruction due to a high and tight prostate and bladder neck, the procedure of choice in this gentleman's case would be a transurethral incision of the prostate and bladder neck  He does not wish to schedule this today, he will call us back if he decides upon surgery  Aleksandrase Ziyad MD  3/24/2021  8:18 AM  Sign when Signing Visit       Problem List Items Addressed This Visit        Cardiovascular and Mediastinum    Erectile dysfunction due to arterial insufficiency      Patient is taking sildenafil as needed for some baseline erectile dysfunction  For this reason finasteride has been avoid in the past   Furthermore his prostate only measures around 24 grams, as such he would get limited benefit from finasteride  He can continue phosphodiesterase 5 inhibition at this time            Genitourinary    Benign localized prostatic hyperplasia with lower urinary tract symptoms (LUTS) - Primary      Patient's complaints are mostly nocturnal   His AUA symptom score is 19 with a bother score of 3, during the day he does also have some slow urinary stream and some sensation of incomplete emptying, although his PVR is normal at 29 milliliters    Main complaint is nocturia, up to 3-4 times per night, he does have a history of having poor sleep, he typically goes to bed around 01:30 and wakes up around 6 or 7, it is been this way his entire life, does not take excessive caffeine  Has not had a sleep study  Nocturia may be multifactorial, his cystoscopic workup today does show a high and tight bladder neck and a prostate measuring 24 grams  The best surgical approach for him would be a transurethral incision of the bladder neck and prostate, I gave him information on this procedure  If he wishes to schedule he will call us to let us know  Otherwise, he will follow up in 6 months         Relevant Orders    POCT Measure PVR (Completed)       Other    Person consulting for explanation of examination or test finding      Real-time review of transrectal ultrasound and cystoscopic findings with the patient, all questions and concerns answered and addressed                 I discussed with Camilla Fajardo the pre, harish, and postoperative care for transurethral incision of the bladder neck and prostate, I gave him information about this, he will discuss this with his wife  I did tell him that men undergoing such procedures will often have a decrease in nocturia of 1-2 voids per night, and rarely have complete resolution of their nocturia  I did disclose to him that there are some men in whom we  Perform this surgery who continue with the same number of nocturnal voids, however    Assessment and plan:        Please see problem oriented charting for the assessment plan of today's urological complaints    Brianna Cordero MD      Chief Complaint     Chief Complaint   Patient presents with    Urinary incontinence, unspecified type    Cystoscopy     trus         History of Present Illness     Sixto Greco is a 60 y o  Man with erectile dysfunction, also with significant lower urinary tract symptoms, wishes to avoid finasteride due to erectile dysfunction, had bothersome side effects from tamsulosin        Previous AUA symptom score was 19/3, today's AUA symptom score is  This same    New complaints include Nocturia 3-4 times per night, no aggravating or alleviating factors, does not take excessive nighttime fluids, has not had a sleep study, unsure if he has sleep apnea or snoring    PVR is 29 milliliters, indicating complete bladder emptying     The following portions of the patient's history were reviewed and updated as appropriate: allergies, current medications, past family history, past medical history, past social history, past surgical history and problem list         Detailed Urologic History     - please refer to HPI    Review of Systems     Review of Systems   Constitutional: Negative  HENT: Negative  Eyes: Negative  Respiratory: Negative  Cardiovascular: Negative  Gastrointestinal: Negative  Endocrine: Negative  Genitourinary: Positive for difficulty urinating, frequency and urgency  Musculoskeletal: Negative  Skin: Negative  Allergic/Immunologic: Negative  Neurological: Negative  Hematological: Negative  Psychiatric/Behavioral: Negative  Allergies     Allergies   Allergen Reactions    Iodine Hives    Shellfish-Derived Products Hives and Anaphylaxis    Glucosamine Hives    Penicillins Other (See Comments)     Rt  Shoulder was frozen       Physical Exam     Physical Exam  Vitals signs reviewed  Constitutional:       General: He is not in acute distress  Appearance: Normal appearance  He is obese  He is not ill-appearing, toxic-appearing or diaphoretic  HENT:      Head: Normocephalic and atraumatic  Eyes:      General: No scleral icterus  Right eye: No discharge  Left eye: No discharge  Cardiovascular:      Pulses: Normal pulses  Pulmonary:      Effort: Pulmonary effort is normal    Abdominal:      General: There is no distension  Palpations: There is no mass        Comments: Well-healed midline incision, patient reports a Whipple procedure previously, bilateral inguinal hernia scars, relatively fresh, well healed, no recurrent hernia   Genitourinary:     Penis: Normal     Musculoskeletal:         General: No swelling  Skin:     General: Skin is warm  Neurological:      General: No focal deficit present  Mental Status: He is alert and oriented to person, place, and time  Cranial Nerves: No cranial nerve deficit  Sensory: No sensory deficit  Psychiatric:         Mood and Affect: Mood normal          Behavior: Behavior normal          Thought Content:  Thought content normal          Judgment: Judgment normal              Vital Signs  Vitals:    03/24/21 0745   BP: 146/88   Pulse: (!) 122   Weight: 117 kg (258 lb)   Height: 5' 11" (1 803 m)         Current Medications       Current Outpatient Medications:     aspirin 81 MG tablet, Take 81 mg by mouth daily, Disp: , Rfl:     BIOTIN PO, Take by mouth, Disp: , Rfl:     cetirizine (ZyrTEC) 10 mg tablet, Take 10 mg by mouth daily, Disp: , Rfl:     diphenhydrAMINE-APAP, sleep, (DIPHENHYDRAMINE-APAP PO), Take 1 capsule by mouth 1 hour before exam , Disp: , Rfl:     hydrocortisone 2 5 % cream, hydrocortisone 2 5 % topical cream, Disp: , Rfl:     ketoconazole (NIZORAL) 2 % shampoo, ketoconazole 2 % shampoo, Disp: , Rfl:     losartan (COZAAR) 50 mg tablet, Take 50 mg by mouth, Disp: , Rfl:     multivitamin (THERAGRAN) TABS, Take 1 tablet by mouth daily, Disp: , Rfl:     NIACINAMIDE ER PO, Take 1,500 mg by mouth daily, Disp: , Rfl:     pancrelipase, Lip-Prot-Amyl, (CREON) 24,000 units, TAKE 2 CAPSULES BY MOUTH 3 (THREE) TIMES A DAY WITH MEALS , Disp: , Rfl:     sildenafil (REVATIO) 20 mg tablet, Take 1 tablet (20 mg total) by mouth as needed (erectile dysfunction) Take 2-4 tablets as needed, Disp: 30 tablet, Rfl: 11      Active Problems     Patient Active Problem List   Diagnosis    Erectile dysfunction due to arterial insufficiency    Benign localized prostatic hyperplasia with lower urinary tract symptoms (LUTS)    Person consulting for explanation of examination or test finding         Past Medical History     Past Medical History:   Diagnosis Date    Hypertension          Surgical History     Past Surgical History:   Procedure Laterality Date    ABDOMINAL SURGERY      APPENDECTOMY      COLONOSCOPY      2018    FACIAL RECONSTRUCTION SURGERY      OTHER SURGICAL HISTORY      skin cancer removal    TONSILLECTOMY      WHIPPLE PROCEDURE W/ LAPAROSCOPY           Family History     Family History   Problem Relation Age of Onset    Hypertension Mother     Heart disease Mother     Osteoarthritis Mother     Cancer Father     Throat cancer Father          Social History     Social History     Social History     Tobacco Use   Smoking Status Former Smoker   Smokeless Tobacco Never Used   Tobacco Comment    quit 25 years ago         Pertinent Lab Values     Lab Results   Component Value Date    CREATININE 0 80 09/15/2019       Lab Results   Component Value Date    PSA 0 6 03/04/2021             Pertinent Imaging       Real-time review of transrectal ultrasound

## 2021-04-28 DIAGNOSIS — N52.9 ERECTILE DYSFUNCTION, UNSPECIFIED ERECTILE DYSFUNCTION TYPE: ICD-10-CM

## 2021-04-28 RX ORDER — SILDENAFIL CITRATE 20 MG/1
20 TABLET ORAL AS NEEDED
Qty: 30 TABLET | Refills: 11 | Status: SHIPPED | OUTPATIENT
Start: 2021-04-28

## 2021-06-28 ENCOUNTER — TELEPHONE (OUTPATIENT)
Dept: UROLOGY | Facility: CLINIC | Age: 66
End: 2021-06-28

## 2021-09-14 NOTE — PROGRESS NOTES
9/15/2021      Chief Complaint   Patient presents with    Follow-up         Assessment and Plan    73 y o  male -- Dr Macy Díaz    1  Erectile dysfunction  - Taking sildenafil as needed for some baseline ED      2  BPH with LUTS  - Cysto/TRUS (3/24/21) showing high and tight bladder neck  Was discussed at that time that the best surgical approach would be a transurethral incision of the bladder neck and prostate, however, patient was uninterested in this at that time  - Patient and wife are uninterested in surgical procedure at this time  - States he is doing better  - Prostate 24 g, would likely have limited benefit from finasteride  - Follow up in 1 year for symptom reassessment  - Will call in the meantime with any questions or concerns  - All questions answered; patient understands and agrees with plan    3  Routine screening prostate cancer  - Most recent PSA 0 6  - GENEVA today showing no abnormalities  - Will follow up in 1 year with PSA prior to visit      History of Present Illness  Nerissa Ordaz is a 73 y o  male patient of Dr Macy Díaz with history of erectile dysfunction and BPH with LUTS here for follow up  States he has nocturia x 1-2, previously 3-4  Denies gross hematuria, dysuria, fevers, chills, nausea, vomiting, weight loss, bone pain  Has tried flomax in the past, however, had nasal congestion with this  Cysto/TRUS in March showing tight bladder neck and surgical intervention was offered at that time, however, patient and wife do not wish to have this done at this time  States his symptoms are improving  Last PSA 0 6  Denies family history of  malignancies    PVR today 29 mL    Review of Systems   Constitutional: Negative for activity change, appetite change, chills and fever  HENT: Negative for congestion and trouble swallowing  Respiratory: Negative for cough and shortness of breath  Cardiovascular: Negative for chest pain, palpitations and leg swelling     Gastrointestinal: Negative for abdominal pain, constipation, diarrhea, nausea and vomiting  Genitourinary: Negative for difficulty urinating, dysuria, flank pain, frequency, hematuria and urgency  Musculoskeletal: Negative for back pain and gait problem  Skin: Negative for wound  Allergic/Immunologic: Negative for immunocompromised state  Neurological: Negative for dizziness and syncope  Hematological: Does not bruise/bleed easily  Psychiatric/Behavioral: Negative for confusion  All other systems reviewed and are negative  Vitals  Vitals:    09/15/21 1111   BP: 116/74   Pulse: 88   Weight: 110 kg (242 lb 9 6 oz)   Height: 5' 11" (1 803 m)       Physical Exam  Constitutional:       Appearance: Normal appearance  HENT:      Head: Normocephalic  Pulmonary:      Effort: Pulmonary effort is normal    Genitourinary:     Comments: Good rectal tone  Prostate smooth, symmetric, no palpable nodules  Musculoskeletal:      Cervical back: Normal range of motion  Skin:     General: Skin is warm and dry  Neurological:      General: No focal deficit present  Mental Status: He is alert and oriented to person, place, and time  Psychiatric:         Mood and Affect: Mood normal          Behavior: Behavior normal          Thought Content:  Thought content normal          Judgment: Judgment normal            Past History  Past Medical History:   Diagnosis Date    Hypertension      Social History     Socioeconomic History    Marital status: /Civil Union     Spouse name: None    Number of children: None    Years of education: None    Highest education level: None   Occupational History    None   Tobacco Use    Smoking status: Former Smoker    Smokeless tobacco: Never Used    Tobacco comment: quit 25 years ago   Vaping Use    Vaping Use: Never used   Substance and Sexual Activity    Alcohol use: No    Drug use: No    Sexual activity: None   Other Topics Concern    None   Social History Narrative    None Social Determinants of Health     Financial Resource Strain:     Difficulty of Paying Living Expenses:    Food Insecurity:     Worried About Running Out of Food in the Last Year:     [address] in the Last Year:    Transportation Needs:     Lack of Transportation (Medical):      Lack of Transportation (Non-Medical):    Physical Activity:     Days of Exercise per Week:     Minutes of Exercise per Session:    Stress:     Feeling of Stress :    Social Connections:     Frequency of Communication with Friends and Family:     Frequency of Social Gatherings with Friends and Family:     Attends Sikhism Services:     Active Member of Clubs or Organizations:     Attends Club or Organization Meetings:     Marital Status:    Intimate Partner Violence:     Fear of Current or Ex-Partner:     Emotionally Abused:     Physically Abused:     Sexually Abused:      Social History     Tobacco Use   Smoking Status Former Smoker   Smokeless Tobacco Never Used   Tobacco Comment    quit 25 years ago     Family History   Problem Relation Age of Onset    Hypertension Mother     Heart disease Mother     Osteoarthritis Mother     Cancer Father     Throat cancer Father        The following portions of the patient's history were reviewed and updated as appropriate: allergies, current medications, past medical history, past social history, past surgical history and problem list     Results  No results found for this or any previous visit (from the past 1 hour(s)) ]  Lab Results   Component Value Date    PSA 0 6 03/04/2021     Lab Results   Component Value Date    CALCIUM 8 6 09/15/2019    K 5 1 09/15/2019    CO2 25 09/15/2019     09/15/2019    BUN 13 09/15/2019    CREATININE 0 80 09/15/2019     Lab Results   Component Value Date    WBC 8 19 09/15/2019    HGB 16 3 09/15/2019    HCT 47 3 09/15/2019    MCV 88 09/15/2019     09/15/2019       Ltanya Krabbe, PA-C

## 2021-09-15 ENCOUNTER — OFFICE VISIT (OUTPATIENT)
Dept: UROLOGY | Facility: CLINIC | Age: 66
End: 2021-09-15
Payer: MEDICARE

## 2021-09-15 VITALS
WEIGHT: 242.6 LBS | HEART RATE: 88 BPM | DIASTOLIC BLOOD PRESSURE: 74 MMHG | HEIGHT: 71 IN | SYSTOLIC BLOOD PRESSURE: 116 MMHG | BODY MASS INDEX: 33.96 KG/M2

## 2021-09-15 DIAGNOSIS — Z12.5 PROSTATE CANCER SCREENING: Primary | ICD-10-CM

## 2021-09-15 DIAGNOSIS — N52.9 ERECTILE DYSFUNCTION, UNSPECIFIED ERECTILE DYSFUNCTION TYPE: ICD-10-CM

## 2021-09-15 DIAGNOSIS — E66.01 MORBID OBESITY DUE TO EXCESS CALORIES (HCC): ICD-10-CM

## 2021-09-15 DIAGNOSIS — R32 URINARY INCONTINENCE, UNSPECIFIED TYPE: ICD-10-CM

## 2021-09-15 PROCEDURE — 99213 OFFICE O/P EST LOW 20 MIN: CPT | Performed by: PHYSICIAN ASSISTANT

## 2021-09-15 RX ORDER — ALPRAZOLAM 0.5 MG/1
TABLET ORAL AS NEEDED
COMMUNITY

## 2021-09-15 RX ORDER — HYDROCORTISONE BUTYRATE 0.1 %
CREAM (GRAM) TOPICAL AS NEEDED
COMMUNITY
End: 2022-07-19

## 2021-09-15 RX ORDER — DIPHENHYDRAMINE HCL 50 MG
CAPSULE ORAL
COMMUNITY
End: 2022-07-19

## 2022-02-15 ENCOUNTER — TELEPHONE (OUTPATIENT)
Dept: UROLOGY | Facility: MEDICAL CENTER | Age: 67
End: 2022-02-15

## 2022-02-15 NOTE — TELEPHONE ENCOUNTER
Call taken from voice mail   Patient requesting call back for an appt  Returned call to patient        appt cancelled and rescheduled for 9/9/22
No

## 2022-07-19 PROBLEM — G89.29 CHRONIC LEFT HIP PAIN: Status: ACTIVE | Noted: 2020-02-26

## 2022-07-19 PROBLEM — M25.569 KNEE PAIN: Status: ACTIVE | Noted: 2020-01-30

## 2022-07-19 PROBLEM — M54.50 LOW BACK PAIN: Status: ACTIVE | Noted: 2022-07-19

## 2022-07-19 PROBLEM — I10 ESSENTIAL HYPERTENSION: Status: ACTIVE | Noted: 2017-12-19

## 2022-07-19 PROBLEM — M70.60 TROCHANTERIC BURSITIS: Status: ACTIVE | Noted: 2019-04-15

## 2022-07-19 PROBLEM — N52.9 ERECTILE DYSFUNCTION: Status: ACTIVE | Noted: 2017-12-19

## 2022-07-19 PROBLEM — M25.552 CHRONIC LEFT HIP PAIN: Status: ACTIVE | Noted: 2020-02-26

## 2022-07-19 PROBLEM — C44.311 BASAL CELL CARCINOMA (BCC) OF ALA NASI: Status: ACTIVE | Noted: 2020-11-02

## 2022-07-19 PROBLEM — M25.559 GREATER TROCHANTERIC PAIN SYNDROME: Status: ACTIVE | Noted: 2019-04-15

## 2022-07-19 PROBLEM — E78.2 MIXED HYPERLIPIDEMIA: Status: ACTIVE | Noted: 2017-12-19

## 2022-09-06 ENCOUNTER — TELEPHONE (OUTPATIENT)
Dept: UROLOGY | Facility: CLINIC | Age: 67
End: 2022-09-06

## 2022-09-06 NOTE — TELEPHONE ENCOUNTER
Called and spoke to Pt to have Labs done PTV  Pt verbalized understanding and will be getting it done PTV  Pt appreciated the call

## 2022-09-07 ENCOUNTER — APPOINTMENT (OUTPATIENT)
Dept: LAB | Facility: CLINIC | Age: 67
End: 2022-09-07
Payer: MEDICARE

## 2022-09-07 DIAGNOSIS — N52.9 ERECTILE DYSFUNCTION, UNSPECIFIED ERECTILE DYSFUNCTION TYPE: ICD-10-CM

## 2022-09-07 DIAGNOSIS — Z12.5 PROSTATE CANCER SCREENING: ICD-10-CM

## 2022-09-07 DIAGNOSIS — R32 URINARY INCONTINENCE, UNSPECIFIED TYPE: ICD-10-CM

## 2022-09-07 PROCEDURE — 84153 ASSAY OF PSA TOTAL: CPT

## 2022-09-08 LAB — PSA SERPL-MCNC: 0.8 NG/ML (ref 0–4)

## 2022-09-08 NOTE — PROGRESS NOTES
9/9/2022      Chief Complaint   Patient presents with    Follow-up     Needle sharp pain in urethra and scrotum Between the anus and scrotum  Assessment and Plan    78 y o  male -- Dr Carlos Abbott    1  Routine prostate cancer screening  - Most recent PSA 0 8, previously 0 6  - GENEVA today benign  - Follow up in 1 year with PSA prior to visit  - Call with any questions or concerns in the meantime  - All questions answered; patient understands and agrees with plan    2  BPH with LUTS  - Cysto/TRUS (3/24/21) showing high and tight bladder neck  Was discussed at that time that the best surgical approach would be a transurethral incision of the bladder neck and prostate, however, patient was uninterested in this at that time  - Patient uninterested in surgical procedure at this time  - States he is doing better  - Prostate 24 g, would likely have limited benefit from finasteride    3  Erectile dysfunction  - Sildenafil with benefit, however, not currently using this    4  Dysuria every few months  - Discussed conservative measures  - Will call if worsens    5  Perineal pain every few months  - Exam benign  - Will call if worsens      History of Present Illness  Patsy Avila is a 78 y o  male patient of Dr Carlos Abbott with history of BPH with LUTS, ED here for follow up  Most recent PSA 0 8, stable  Denies any family history of  malignancies  Had cystoscopy and TRUS, however uninterested in surgical intervention  Overall happy with urination at this time  Uses sildenafil as needed for ED  Denies side effects  Does have dysuria and perineal pain every few months  Recently  from wife  Review of Systems   Constitutional: Negative for activity change, appetite change, chills and fever  HENT: Negative for congestion and trouble swallowing  Respiratory: Negative for cough and shortness of breath  Cardiovascular: Negative for chest pain, palpitations and leg swelling     Gastrointestinal: Negative for abdominal pain, constipation, diarrhea, nausea and vomiting  Genitourinary: Negative for difficulty urinating, dysuria, flank pain, frequency, hematuria and urgency  Musculoskeletal: Negative for back pain and gait problem  Skin: Negative for wound  Allergic/Immunologic: Negative for immunocompromised state  Neurological: Negative for dizziness and syncope  Hematological: Does not bruise/bleed easily  Psychiatric/Behavioral: Negative for confusion  All other systems reviewed and are negative  AUA SYMPTOM SCORE    Flowsheet Row Most Recent Value   AUA SYMPTOM SCORE    How often have you had a sensation of not emptying your bladder completely after you finished urinating? 4 (P)     How often have you had to urinate again less than two hours after you finished urinating? 3 (P)     How often have you found you stopped and started again several times when you urinate? 3 (P)     How often have you found it difficult to postpone urination? 5 (P)     How often have you had a weak urinary stream? 3 (P)     How often have you had to push or strain to begin urination? 2 (P)     How many times did you most typically get up to urinate from the time you went to bed at night until the time you got up in the morning? 1 (P)     Quality of Life: If you were to spend the rest of your life with your urinary condition just the way it is now, how would you feel about that? 2 (P)     AUA SYMPTOM SCORE 21 (P)            Vitals  Vitals:    09/09/22 0753   BP: 128/76   Pulse: 82   SpO2: 99%   Weight: 98 kg (216 lb)   Height: 5' 11" (1 803 m)       Physical Exam  Constitutional:       General: He is not in acute distress  Appearance: Normal appearance  He is not ill-appearing, toxic-appearing or diaphoretic  HENT:      Head: Normocephalic  Nose: No congestion  Eyes:      General: No scleral icterus  Right eye: No discharge  Left eye: No discharge        Conjunctiva/sclera: Conjunctivae normal       Pupils: Pupils are equal, round, and reactive to light  Pulmonary:      Effort: Pulmonary effort is normal    Genitourinary:     Comments: Prostate smooth, symmetrical, no palpable nodules  Circumcised penis, normal phallus, orthotopic and patent meatus  Testes are smooth and descended bilaterally, non-tender without palpable mass  Scrotal and inguinal skin without abnormalities    Musculoskeletal:      Cervical back: Normal range of motion  Skin:     General: Skin is warm and dry  Coloration: Skin is not jaundiced or pale  Findings: No bruising, erythema, lesion or rash  Neurological:      General: No focal deficit present  Mental Status: He is alert and oriented to person, place, and time  Mental status is at baseline  Gait: Gait normal    Psychiatric:         Mood and Affect: Mood normal          Behavior: Behavior normal          Thought Content:  Thought content normal          Judgment: Judgment normal            Past History  Past Medical History:   Diagnosis Date    Hypertension      Social History     Socioeconomic History    Marital status: /Civil Union     Spouse name: None    Number of children: None    Years of education: None    Highest education level: None   Occupational History    None   Tobacco Use    Smoking status: Former Smoker    Smokeless tobacco: Never Used    Tobacco comment: quit 25 years ago   Vaping Use    Vaping Use: Never used   Substance and Sexual Activity    Alcohol use: No    Drug use: No    Sexual activity: Not Currently   Other Topics Concern    None   Social History Narrative    None     Social Determinants of Health     Financial Resource Strain: Not on file   Food Insecurity: Not on file   Transportation Needs: Not on file   Physical Activity: Not on file   Stress: Not on file   Social Connections: Not on file   Intimate Partner Violence: Not on file   Housing Stability: Not on file     Social History     Tobacco Use Smoking Status Former Smoker   Smokeless Tobacco Never Used   Tobacco Comment    quit 25 years ago     Family History   Problem Relation Age of Onset    Hypertension Mother     Heart disease Mother     Osteoarthritis Mother     Cancer Father     Throat cancer Father        The following portions of the patient's history were reviewed and updated as appropriate: allergies, current medications, past medical history, past social history, past surgical history and problem list     Results  No results found for this or any previous visit (from the past 1 hour(s)) ]  Lab Results   Component Value Date    PSA 0 8 09/07/2022    PSA 0 6 03/04/2021     Lab Results   Component Value Date    CALCIUM 8 6 09/15/2019    K 5 1 09/15/2019    CO2 25 09/15/2019     09/15/2019    BUN 13 09/15/2019    CREATININE 0 80 09/15/2019     Lab Results   Component Value Date    WBC 8 19 09/15/2019    HGB 16 3 09/15/2019    HCT 47 3 09/15/2019    MCV 88 09/15/2019     09/15/2019       Delisa Larson PA-C

## 2022-09-09 ENCOUNTER — OFFICE VISIT (OUTPATIENT)
Dept: UROLOGY | Facility: CLINIC | Age: 67
End: 2022-09-09
Payer: MEDICARE

## 2022-09-09 VITALS
SYSTOLIC BLOOD PRESSURE: 128 MMHG | WEIGHT: 216 LBS | HEIGHT: 71 IN | HEART RATE: 82 BPM | DIASTOLIC BLOOD PRESSURE: 76 MMHG | OXYGEN SATURATION: 99 % | BODY MASS INDEX: 30.24 KG/M2

## 2022-09-09 DIAGNOSIS — E66.01 MORBID OBESITY DUE TO EXCESS CALORIES (HCC): ICD-10-CM

## 2022-09-09 DIAGNOSIS — Z12.5 PROSTATE CANCER SCREENING: Primary | ICD-10-CM

## 2022-09-09 DIAGNOSIS — N40.1 BENIGN PROSTATIC HYPERPLASIA WITH LOWER URINARY TRACT SYMPTOMS, SYMPTOM DETAILS UNSPECIFIED: ICD-10-CM

## 2022-09-09 PROCEDURE — 99213 OFFICE O/P EST LOW 20 MIN: CPT | Performed by: PHYSICIAN ASSISTANT

## 2022-09-12 ENCOUNTER — OFFICE VISIT (OUTPATIENT)
Dept: URGENT CARE | Facility: CLINIC | Age: 67
End: 2022-09-12
Payer: MEDICARE

## 2022-09-12 VITALS
WEIGHT: 215.25 LBS | BODY MASS INDEX: 30.02 KG/M2 | OXYGEN SATURATION: 99 % | DIASTOLIC BLOOD PRESSURE: 82 MMHG | RESPIRATION RATE: 16 BRPM | HEART RATE: 92 BPM | SYSTOLIC BLOOD PRESSURE: 130 MMHG | TEMPERATURE: 99 F

## 2022-09-12 DIAGNOSIS — M79.651 ACUTE THIGH PAIN, RIGHT: Primary | ICD-10-CM

## 2022-09-12 PROCEDURE — G0463 HOSPITAL OUTPT CLINIC VISIT: HCPCS | Performed by: FAMILY MEDICINE

## 2022-09-12 PROCEDURE — 99213 OFFICE O/P EST LOW 20 MIN: CPT | Performed by: FAMILY MEDICINE

## 2022-09-12 NOTE — PROGRESS NOTES
[facility]        NAME: Wiliam Harding is a 78 y o  male  : 1955    MRN: [id number]  DATE: 2022  TIME: 5:58 PM    Assessment and Plan   Acute thigh pain, right [M79 651]  1  Acute thigh pain, right           Patient Instructions     Should use heat and Tylenol as needed  Contact his PCP tomorrow if symptoms persists  Proceed to  ER if symptoms worsen  Chief Complaint     Chief Complaint   Patient presents with    Leg Pain     Started this morning, right inner thigh  Throbbing pain  Denies trauma  Took tylenol with some relief  Denies brusing or lump         History of Present Illness       Abbe Bartholomew is here for pain in his right thigh  Started this morning  Throbbing pain  Took some Tylenol which helped  No swelling  No pain with walking  No known strain or trauma  Saw his urologist last week with normal exam  Had some perineal pain but none now         Review of Systems   Review of Systems      Current Medications       Current Outpatient Medications:     ALPRAZolam (XANAX) 0 5 mg tablet, as needed, Disp: , Rfl:     aspirin 81 MG tablet, Take 81 mg by mouth daily, Disp: , Rfl:     azithromycin (ZITHROMAX) 250 mg tablet, Take 250 mg by mouth every 24 hours, Disp: , Rfl:     BIOTIN PO, Take by mouth, Disp: , Rfl:     cetirizine (ZyrTEC) 10 mg tablet, Take 10 mg by mouth daily, Disp: , Rfl:     clindamycin (CLINDAGEL) 1 % gel, Apply 1 application topically 2 (two) times a day, Disp: , Rfl:     hydrocortisone 2 5 % cream, hydrocortisone 2 5 % topical cream, Disp: , Rfl:     ketoconazole (NIZORAL) 2 % shampoo, ketoconazole 2 % shampoo, Disp: , Rfl:     losartan (COZAAR) 50 mg tablet, Take 50 mg by mouth, Disp: , Rfl:     multivitamin (THERAGRAN) TABS, Take 1 tablet by mouth daily, Disp: , Rfl:     pancrelipase, Lip-Prot-Amyl, (CREON) 24,000 units, TAKE 2 CAPSULES BY MOUTH 3 (THREE) TIMES A DAY WITH MEALS , Disp: , Rfl:     sildenafil (REVATIO) 20 mg tablet, Take 1 tablet (20 mg total) by mouth as needed (erectile dysfunction) Take 2-4 tablets as needed, Disp: 30 tablet, Rfl: 11    Current Allergies     Allergies as of 09/12/2022 - Reviewed 09/12/2022   Allergen Reaction Noted    Iodine - food allergy Hives 07/06/2017    Shellfish-derived products - food allergy Hives and Anaphylaxis 02/12/2017    Glucosamine Hives 02/12/2017    Penicillins Other (See Comments) 11/24/2017            The following portions of the patient's history were reviewed and updated as appropriate: allergies, current medications, past family history, past medical history, past social history, past surgical history and problem list      Past Medical History:   Diagnosis Date    Hypertension        Past Surgical History:   Procedure Laterality Date    ABDOMINAL SURGERY      APPENDECTOMY      COLONOSCOPY      2018    FACIAL RECONSTRUCTION SURGERY      OTHER SURGICAL HISTORY      skin cancer removal    TONSILLECTOMY      WHIPPLE PROCEDURE W/ LAPAROSCOPY         Family History   Problem Relation Age of Onset    Hypertension Mother     Heart disease Mother     Osteoarthritis Mother     Cancer Father     Throat cancer Father          Medications have been verified  Objective   /82   Pulse 92   Temp 99 °F (37 2 °C)   Resp 16   Wt 97 6 kg (215 lb 4 oz)   SpO2 99%   BMI 30 02 kg/m²        Physical Exam     Physical Exam  Constitutional:       General: He is not in acute distress  Appearance: He is not ill-appearing  Musculoskeletal:      Comments: Right thigh looks normal  No masses, redness, or warmth  No ecchymoses  No inguinal adenopathy

## 2022-09-29 ENCOUNTER — TELEPHONE (OUTPATIENT)
Dept: DERMATOLOGY | Facility: CLINIC | Age: 67
End: 2022-09-29

## 2022-09-29 NOTE — TELEPHONE ENCOUNTER
Pre- operative Mohs Telephone Scheduling Note    Do you have a pacemaker or defibrillator? no    Do you take antibiotics before skin or dental procedures? no  If yes, will likely require pre-operative antibiotics  Ask  the patient why they take the antibiotics (usually because of joint replacement)  Do you have a history of a joint replacements within the past 2 years? no   If yes, will likely require pre-operative antibiotics  Ask if orthopaedic surgeon has prescribed pre-operative antibiotics to take before procedures/dental work? Do you take any OTC medications that thin your blood (Aspirin, Aleve, Ibuprofen) or supplements that thin your blood (fish oil, garlic, vitamin E, Ginko Biloba)? yes: ASA    Do you take any prescribed medications that thin your blood (Coumadin, Plavix, Xarelto, Eliquis or another prescribed blood thinner)? no    Do you have an allergy to lidocaine or epinephrine? no    Do you have an allergy to shellfish? yes: Iodine Shellfish    Do you smoke? no      If yes,  patient to try and stop 2 days before surgery and 7 days after the surgery  Minimizing smoking as much as possible during this time will improve healing and the cosmetic result after surgery  Date scheduled: 10/12/2022 with Dr Jeremiah Ohara 8:00 am , SCC left superior lateral neck    Coordination of Care with other provider (Oculoplastics, Plastics, ENT) required? no   IF YES, PLEASE FORWARD TO APPROPRIATE PERSONNEL TO HELP COORDINATE  Are there remaining tumors to be scheduled? no    Was Prior Authorization obtained? No (please use  [email or web address] to document prior auth)      PRIOR AUTH FORM        TAX AJ#[id number]    ? Ld Dill (W#[id number])   ? Pipo Roche ([facility]#[id number])       X Jeffrey Abraham ([facility] [place]#[id number])    MOHS Procedure:    X  [id number] head, neck , hands, feet, genitalia (include  for each additional stage)     ? 39444 trunk, arms, legs (include 98387 for each additional stage)     Repair CPT DOYJ:[id number] (intermediate closure)    ? Diagnosis/ICD code: [facility] Left  Superior Lateral neck         Primary Insurance:   [facility] [place]     Phone#: [phone]    Member DZ#:[id number]     Secondary insurance:     Phone#:    Member ID#:    ?Need Authorization: No                            ?Needs Referral: No    Reference#:  [id number]  10/05/2022 @ 9:32am

## 2022-10-12 ENCOUNTER — PROCEDURE VISIT (OUTPATIENT)
Dept: DERMATOLOGY | Facility: CLINIC | Age: 67
End: 2022-10-12
Payer: MEDICARE

## 2022-10-12 VITALS
TEMPERATURE: 98.2 F | WEIGHT: 215.6 LBS | BODY MASS INDEX: 30.18 KG/M2 | HEART RATE: 72 BPM | OXYGEN SATURATION: 98 % | SYSTOLIC BLOOD PRESSURE: 122 MMHG | DIASTOLIC BLOOD PRESSURE: 79 MMHG | HEIGHT: 71 IN

## 2022-10-12 DIAGNOSIS — C44.42 SQUAMOUS CELL CARCINOMA OF NECK: Primary | ICD-10-CM

## 2022-10-12 PROCEDURE — 17311 MOHS 1 STAGE H/N/HF/G: CPT | Performed by: STUDENT IN AN ORGANIZED HEALTH CARE EDUCATION/TRAINING PROGRAM

## 2022-10-12 PROCEDURE — 12042 INTMD RPR N-HF/GENIT2.6-7.5: CPT | Performed by: STUDENT IN AN ORGANIZED HEALTH CARE EDUCATION/TRAINING PROGRAM

## 2022-10-12 NOTE — PATIENT INSTRUCTIONS
Mohs Microscopic Surgery After Care    WOUND CARE AFTER SURGERY:    Do NOT to remove the pressure bandage for 48 hours  Keep the area clean and dry while this bandage is on  After removing the bandage for the first time, gently clean the area with soap and water  If the bandage is difficult to remove, getting the bandage wet in the shower will sometimes help soften the adhesive and allow it to be removed more easily  You will now need to cleanse this area daily in the shower with gentle soap  There is no need to scrub the area  Apply plain Vaseline ointment (this is over the counter and not a prescription) to the site followed by a clean appropriately sized bandage to area  Non stick dressing and paper tape (or Hypafix) are recommended for sensitive skin but a bandaid is fine if it covers the area well  You will need to continue the above daily wound care until you return for suture removal in 14 days (10-14 days off the face)      RESTRICTIONS:     For two DAYS:   - You will need to take it very easy as this time is highest risk for bleeding  Being a "couch potato" during these two days is generally recommended  - For surgeries on the face/neck/scalp: Avoid leaning down to pick things up off the floor as this brings blood up to your head  Instead, squat down to pick things up  For two WEEKS:   - No heavy lifting (anything greater than 10 pounds)   - You can start to do slow, gentle activities such as slow walking but nothing to increase your heart rate and blood pressure too much (such as cardiovascular exercise)  It is important to take it easy as there is still a risk for bleeding and a high risk popping of stitches open during this time  MANAGING YOUR PAIN AFTER SURGERY     You can expect to have some pain after surgery  This is normal  The pain is typically worse the first two days after surgery, and quickly begins to get better       The best strategy for controlling your pain after surgery is around the clock pain control  You can take over the counter Acetaminophen (Tylenol) for discomfort, if no contraindications  If you are taking this at the maximum dose, you can alternate this with Motrin (ibuprofen or Advil) as well  Alternating these medications with each other allows you to maximize your pain control  In addition to Tylenol and Motrin, you can use heating pads or ice packs on your incisions to help reduce your pain  How will I alternate your regular strength over-the-counter pain medication? You will take a dose of pain medication every three hours  Start by taking 650 mg of Tylenol (2 pills of 325 mg)   3 hours later take 600 mg of Motrin (3 pills of 200 mg)   3 hours after taking the Motrin take 650 mg of Tylenol   3 hours after that take 600 mg of Motrin  See example - if your first dose of Tylenol is at 12:00 PM     12:00 PM  Tylenol 650 mg (2 pills of 325 mg)    3:00 PM  Motrin 600 mg (3 pills of 200 mg)    6:00 PM  Tylenol 650 mg (2 pills of 325 mg)    9:00 PM  Motrin 600 mg (3 pills of 200 mg)    Continue alternating every 3 hours      Important:   Do not take more than 4000mg of Tylenol or 3200mg of Motrin in a 24-hour period  What if I still have pain? If you have pain that is not controlled with the over-the-counter pain medications (Tylenol and Motrin or Advil), don't hesitate to call our staff using the number provided  We will help make sure you are managing your pain in the best way possible, and if necessary, we can provide a prescription for additional pain medication  CALL OUR OFFICE IMMEDIATELY FOR ANY SIGNS OF INFECTION:    This includes fever, chills, increased redness, warmth, tenderness, severe discomfort/pain, or pus or foul smell coming from the wound  [facility] directly at [phone] (SKIN)    IF BLEEDING IS NOTICED:    Place a clean cloth over the area and apply firm pressure for thirty minutes    Check the wound ONLY after 30 minutes of direct pressure; do not cheat and sneak a peak, as that does not count  If bleeding persists after 30 minutes of legitimate direct pressure, then try one more round of direct pressure to the area  Should the bleeding become heavier or not stop after the second attempt, call [facility] directly at [phone] (SKIN)  Your call will get routed to the dermatology surgeon on call even after hours

## 2022-10-12 NOTE — PROGRESS NOTES
MOHS Procedure Note    Patient: Malvin Holbrook  : [id number]  MRN: [id number]  Date: 10/12/2022    History of Present Illness: The patient is a 78 y o  male who presents with complaints of Scc on left superior lateral neck     Past Medical History:   Diagnosis Date   • Hypertension    • Squamous cell skin cancer 2022    left lateral neck       Past Surgical History:   Procedure Laterality Date   • ABDOMINAL SURGERY     • APPENDECTOMY     • COLONOSCOPY         • FACIAL RECONSTRUCTION SURGERY     • MOHS SURGERY  10/12/2022    left lateral neck    Dr Ema Boo   • OTHER SURGICAL HISTORY      skin cancer removal   • TONSILLECTOMY     • WHIPPLE PROCEDURE W/ LAPAROSCOPY           Current Outpatient Medications:   •  ALPRAZolam (XANAX) 0 5 mg tablet, as needed, Disp: , Rfl:   •  aspirin 81 MG tablet, Take 81 mg by mouth daily, Disp: , Rfl:   •  BIOTIN PO, Take by mouth, Disp: , Rfl:   •  cetirizine (ZyrTEC) 10 mg tablet, Take 10 mg by mouth daily, Disp: , Rfl:   •  clindamycin (CLINDAGEL) 1 % gel, Apply 1 application topically 2 (two) times a day, Disp: , Rfl:   •  hydrocortisone 2 5 % cream, hydrocortisone 2 5 % topical cream, Disp: , Rfl:   •  ketoconazole (NIZORAL) 2 % shampoo, ketoconazole 2 % shampoo, Disp: , Rfl:   •  losartan (COZAAR) 50 mg tablet, Take 50 mg by mouth, Disp: , Rfl:   •  multivitamin (THERAGRAN) TABS, Take 1 tablet by mouth daily, Disp: , Rfl:   •  pancrelipase, Lip-Prot-Amyl, (CREON) 24,000 units, TAKE 2 CAPSULES BY MOUTH 3 (THREE) TIMES A DAY WITH MEALS , Disp: , Rfl:   •  sildenafil (REVATIO) 20 mg tablet, Take 1 tablet (20 mg total) by mouth as needed (erectile dysfunction) Take 2-4 tablets as needed, Disp: 30 tablet, Rfl: 11  •  azithromycin (ZITHROMAX) 250 mg tablet, Take 250 mg by mouth every 24 hours (Patient not taking: Reported on 10/12/2022), Disp: , Rfl:     Allergies   Allergen Reactions   • Iodine - Food Allergy Hives   • Shellfish-Derived Products - Food Allergy Hives and Anaphylaxis   • Glucosamine Hives   • Penicillins Other (See Comments)     Rt  Shoulder was frozen       Physical Exam:   Vitals:    10/12/22 0808   BP: 122/79   Pulse: 72   Temp: 98 2 °F (36 8 °C)   SpO2: 98%     General: Awake, Alert, Oriented x 3, Mood and affect appropriate  Respiratory: Respirations even and unlabored  Cardiovascular: Peripheral pulses intact; no edema  Musculoskeletal Exam: Normal   Skin: left neck with pink atrophic biopsy scar, triangulated to prior bx site using surrounding identifying marks    Assessment: Bx proven SCC on left superior lateral neck     Plan: Mohs     MOHS Procedure Timeout    Flowsheet Row Most Recent Value   Timeout: 9693   Patient Identity Verified: Yes   Correct Site Verified: Yes   Correct Procedure Verified: Yes          MOHS Diagnosis/Indication/Location/ID    Flowsheet Row Most Recent Value   Pathology Type Squamous cell carcinoma   Anatomic Site --  [left superior lateral neck]   Indications for MOHS tumor location, large tumor size   MOHS ID [id number]          MOHS Site/Accession/Pre-Post    Flowsheet Row Most Recent Value   Original Site Identified (as submitted by referring clinician) Referral, Photo   Biopsy Accession/Specimen # (as submitted by referring clincian) [id number]   Pre-MOHS Size Length (cm) 0 8   Pre-MOHS Size Width (cm) 0 7   Post-MOHS Size-Length (cm) 1 3   Post MOHS Size-Width (cm) 0 9   Repair Type Intermediate layered closure   Suture Type Vicryl, Prolene   Prolene Suture Size 6   Vicryl Suture Size 4   Final repair length (cm): 3 5   Anesthetic Used 1% Lidocaine with epinephrine          MOHS Tumor Stage 1 Information    Flowsheet Row Most Recent Value   Tissue Sections (blocks) 2   Microscopic Exam Section 1: No tumor identified in section  Microscopic Exam Section 2: No tumor identified in section  Tumor Clear After Stage I? Yes                     Patient identified, procedure verified, site identified and verified   Time out completed  Surgical removal of the lesion discussed with the patient (risks and benefits, including possibility of scarring, infection, recurrence or potential for further treatment)  I have specifically identified the site with the patient  I have discussed the fact that the patient will have a scar after the procedure regardless of granulation or repair with sutures  I have discussed that the repair options can range from granulation in some cases to linear or curvilinear closures to larger flaps or grafts  There are sometimes flaps or grafts used that require multiples stages of surgery and will not be completed today, rather be completed over a series of appointments  I have discussed that occasionally due to location, size or depth of the lesion I may recommend consultation with and transfer of care for further removal or the reconstruction to another provider such as ophthalmology surgery, plastic surgery, ENT surgery, or surgical oncology  There are cases in which other testing such as imaging with MRI or CT scan or testing of lymph nodes is recommended because of the nature/depth/location of tumor seen during the removal  There is a risk of injury to nerves causing temporary or permanent numbness or the inability to move muscles full such as the inability to lift eyebrows  Questions answered and verbal and written consent was obtained  The tumor qualifies for Mohs based on AUC criteria  With the patient in the supine position and under adequate local anesthesia with 1% lidocaine with epinephrine 1:100,000, the defect was scrubbed with Hibiclens  Sterile drapes were placed from the sterile tray  Because of the location of the surgical defect, an intermediate closure was judged to give the best possible cosmetic and functional result  The edges of the defect were carefully debrided removing any dead or coagulated tissue  Hemostasis was obtained by pinpoint electrocoagulation    Careful planning of removal of redundant tissue at either end of the defect was drawn out so that the suture lines would fall in the optimal orientation with regard to the relaxed skin tension lines  These were then removed with a #15 blade scalpel  The wound was then approximated by a deep layer of buried vertical mattress sutures and the cutaneous margins were approximated and closed by superficial sutures as noted above  Estimated blood loss was less than 5 mL  The patient tolerated the procedure well  The wound was dressed with petrolatum, a non-stick pad, and a compression dressing  Medhat Campbell served as the surgeon and pathologist during the procedure  Postoperative care: Wound care discussed at length  I urged the patient to call us if any problems or question should arise  Complications: none  Post-op medications: none  Patient condition after procedure: stable  Discharge plans: Plan for suture removal 10-14 days, at next scheduled appointment         Scribe Attestation    I,:  Moses Chauhan am acting as a scribe while in the presence of the attending physician :       I,:  Medhat Campbell MD personally performed the services described in this documentation    as scribed in my presence :

## 2022-10-25 ENCOUNTER — TELEPHONE (OUTPATIENT)
Dept: DERMATOLOGY | Facility: CLINIC | Age: 67
End: 2022-10-25

## 2022-10-25 NOTE — TELEPHONE ENCOUNTER
Pt called in and said he thought he canceled this appt  b/c he scheduled it with someone closer to his house  I did cancel the appt for the pt    Thank you

## 2022-12-21 ENCOUNTER — TELEPHONE (OUTPATIENT)
Dept: OTHER | Facility: OTHER | Age: 67
End: 2022-12-21

## 2022-12-21 NOTE — TELEPHONE ENCOUNTER
Spoke to patient, pulled old reports from system, last colonoscopy was done 8/10/2018 w/5 year recall, called patient told him he would be due in August of 2023, put on recall list so we would contact him when it got closer, scanned reports from 2018 and 2006 to be put in patients chart

## 2023-08-11 ENCOUNTER — APPOINTMENT (OUTPATIENT)
Dept: LAB | Facility: CLINIC | Age: 68
End: 2023-08-11
Payer: MEDICARE

## 2023-08-11 DIAGNOSIS — N40.1 BENIGN PROSTATIC HYPERPLASIA WITH LOWER URINARY TRACT SYMPTOMS, SYMPTOM DETAILS UNSPECIFIED: ICD-10-CM

## 2023-08-11 DIAGNOSIS — Z12.5 PROSTATE CANCER SCREENING: ICD-10-CM

## 2023-08-11 LAB — PSA SERPL-MCNC: 0.9 NG/ML (ref 0–4)

## 2023-08-11 PROCEDURE — 36415 COLL VENOUS BLD VENIPUNCTURE: CPT

## 2023-08-11 PROCEDURE — 84153 ASSAY OF PSA TOTAL: CPT

## 2023-09-12 NOTE — PROGRESS NOTES
9/14/2023      Chief Complaint   Patient presents with   • Follow-up     1 year check, BPH w/LUTS          Assessment and Plan    80 y.o. male     1. Routine prostate cancer screening  - PSA (8/11/23) 0.90, stable  - GENEVA today benign  - Follow up in 1 year with PSA prior to visit    2. BPH with LUTS  - Cysto/TRUS (3/24/21) showing high and tight bladder neck. Was discussed at that time that the best surgical approach would be a transurethral incision of the bladder neck and prostate, however, patient was uninterested in this at that time  - Patient states symptoms have worsened  - Return for cystoscopy TRUS to repeat workup for surgical planning as patient is now interested   - Call with any questions or concerns in the meantime  - All questions answered; patient understands and agrees with plan       History of Present Illness  Toni Woods is a 80 y.o. male patient with history of BPH with LUTS, ED here for follow up. Most recent PSA 0.9, stable. Denies family history of  malignancies. Had cystoscopy and TRUS, however was uninterested in surgical intervention. Patient having worsening urinary symptoms with frequency, urgency, hesitation, dripping post void, nocturia 1-2 times. Is now interested in surgical intervention. No longer using sildenafil for erections. Review of Systems   Constitutional: Negative for activity change, appetite change, chills and fever. HENT: Negative for congestion and trouble swallowing. Respiratory: Negative for cough and shortness of breath. Cardiovascular: Negative for chest pain, palpitations and leg swelling. Gastrointestinal: Negative for abdominal pain, constipation, diarrhea, nausea and vomiting. Genitourinary: Positive for difficulty urinating, frequency and urgency. Negative for dysuria, flank pain and hematuria. Musculoskeletal: Negative for back pain and gait problem. Skin: Negative for wound.    Allergic/Immunologic: Negative for immunocompromised state.   Neurological: Negative for dizziness and syncope. Hematological: Does not bruise/bleed easily. Psychiatric/Behavioral: Negative for confusion. All other systems reviewed and are negative. AUA SYMPTOM SCORE    Flowsheet Row Most Recent Value   AUA SYMPTOM SCORE    How often have you had a sensation of not emptying your bladder completely after you finished urinating? 5 (P)     How often have you had to urinate again less than two hours after you finished urinating? 5 (P)     How often have you found you stopped and started again several times when you urinate? 4 (P)     How often have you found it difficult to postpone urination? 5 (P)     How often have you had a weak urinary stream? 5 (P)     How often have you had to push or strain to begin urination? 5 (P)     How many times did you most typically get up to urinate from the time you went to bed at night until the time you got up in the morning? 1 (P)     Quality of Life: If you were to spend the rest of your life with your urinary condition just the way it is now, how would you feel about that? 3 (P)     AUA SYMPTOM SCORE 30 (P)            Vitals  Vitals:    09/14/23 0811   BP: 132/80   Pulse: 67   Resp: 18   SpO2: 94%   Weight: 110 kg (243 lb 3.2 oz)   Height: 5' 11" (1.803 m)       Physical Exam  Constitutional:       General: He is not in acute distress. Appearance: Normal appearance. He is not ill-appearing, toxic-appearing or diaphoretic. HENT:      Head: Normocephalic. Nose: No congestion. Eyes:      General: No scleral icterus. Right eye: No discharge. Left eye: No discharge. Conjunctiva/sclera: Conjunctivae normal.      Pupils: Pupils are equal, round, and reactive to light. Pulmonary:      Effort: Pulmonary effort is normal.   Genitourinary:     Comments: Prostate smooth, symmetrical, no palpable nodules    Musculoskeletal:      Cervical back: Normal range of motion.    Skin:     General: Skin is warm and dry. Coloration: Skin is not jaundiced or pale. Findings: No bruising, erythema, lesion or rash. Neurological:      General: No focal deficit present. Mental Status: He is alert and oriented to person, place, and time. Mental status is at baseline. Gait: Gait normal.   Psychiatric:         Mood and Affect: Mood normal.         Behavior: Behavior normal.         Thought Content:  Thought content normal.         Judgment: Judgment normal.           Past History  Past Medical History:   Diagnosis Date   • Hypertension    • Squamous cell skin cancer 09/19/2022    left lateral neck     Social History     Socioeconomic History   • Marital status: Legally      Spouse name: None   • Number of children: None   • Years of education: None   • Highest education level: None   Occupational History   • None   Tobacco Use   • Smoking status: Former     Passive exposure: Past   • Smokeless tobacco: Never   • Tobacco comments:     quit 25 years ago   Vaping Use   • Vaping Use: Never used   Substance and Sexual Activity   • Alcohol use: No   • Drug use: No   • Sexual activity: Not Currently   Other Topics Concern   • None   Social History Narrative   • None     Social Determinants of Health     Financial Resource Strain: Not on file   Food Insecurity: Not on file   Transportation Needs: Not on file   Physical Activity: Not on file   Stress: Not on file   Social Connections: Not on file   Intimate Partner Violence: Not on file   Housing Stability: Not on file     Social History     Tobacco Use   Smoking Status Former   • Passive exposure: Past   Smokeless Tobacco Never   Tobacco Comments    quit 25 years ago     Family History   Problem Relation Age of Onset   • Hypertension Mother    • Heart disease Mother    • Osteoarthritis Mother    • Cancer Father    • Throat cancer Father        The following portions of the patient's history were reviewed and updated as appropriate: allergies, current medications, past medical history, past social history, past surgical history and problem list.    Results  No results found for this or any previous visit (from the past 1 hour(s)).]  Lab Results   Component Value Date    PSA 0.90 08/11/2023    PSA 0.8 09/07/2022    PSA 0.6 03/04/2021     Lab Results   Component Value Date    CALCIUM 8.6 09/15/2019    K 5.1 09/15/2019    CO2 25 09/15/2019     09/15/2019    BUN 13 09/15/2019    CREATININE 0.80 09/15/2019     Lab Results   Component Value Date    WBC 8.19 09/15/2019    HGB 16.3 09/15/2019    HCT 47.3 09/15/2019    MCV 88 09/15/2019     09/15/2019       Rosanne Morin PA-C

## 2023-09-13 RX ORDER — DIPHENHYDRAMINE HCL 50 MG
CAPSULE ORAL
COMMUNITY
End: 2023-09-14

## 2023-09-13 RX ORDER — DEXTROMETHORPHAN HYDROBROMIDE AND PROMETHAZINE HYDROCHLORIDE 15; 6.25 MG/5ML; MG/5ML
SYRUP ORAL
COMMUNITY
End: 2023-09-14

## 2023-09-13 RX ORDER — FLUOXETINE HYDROCHLORIDE 20 MG/1
1 CAPSULE ORAL DAILY
COMMUNITY
End: 2023-09-14

## 2023-09-13 RX ORDER — FLUOXETINE HYDROCHLORIDE 40 MG/1
40 CAPSULE ORAL DAILY
COMMUNITY
Start: 2022-12-08

## 2023-09-13 RX ORDER — POLYETHYLENE GLYCOL 3350
POWDER (GRAM) MISCELLANEOUS
COMMUNITY
End: 2023-09-14

## 2023-09-13 RX ORDER — DOXYCYCLINE HYCLATE 100 MG
1 TABLET ORAL 2 TIMES DAILY
COMMUNITY
End: 2023-09-14

## 2023-09-13 RX ORDER — AMOXICILLIN 875 MG/1
TABLET, COATED ORAL
COMMUNITY
End: 2023-09-14

## 2023-09-14 ENCOUNTER — OFFICE VISIT (OUTPATIENT)
Dept: UROLOGY | Facility: CLINIC | Age: 68
End: 2023-09-14
Payer: MEDICARE

## 2023-09-14 VITALS
DIASTOLIC BLOOD PRESSURE: 80 MMHG | SYSTOLIC BLOOD PRESSURE: 132 MMHG | RESPIRATION RATE: 18 BRPM | WEIGHT: 243.2 LBS | BODY MASS INDEX: 34.05 KG/M2 | HEART RATE: 67 BPM | HEIGHT: 71 IN | OXYGEN SATURATION: 94 %

## 2023-09-14 DIAGNOSIS — Z12.5 PROSTATE CANCER SCREENING: Primary | ICD-10-CM

## 2023-09-14 DIAGNOSIS — N40.1 BENIGN PROSTATIC HYPERPLASIA WITH LOWER URINARY TRACT SYMPTOMS, SYMPTOM DETAILS UNSPECIFIED: ICD-10-CM

## 2023-09-14 PROCEDURE — 99213 OFFICE O/P EST LOW 20 MIN: CPT | Performed by: PHYSICIAN ASSISTANT

## 2023-10-17 ENCOUNTER — PROCEDURE VISIT (OUTPATIENT)
Dept: UROLOGY | Facility: CLINIC | Age: 68
End: 2023-10-17
Payer: MEDICARE

## 2023-10-17 VITALS
RESPIRATION RATE: 16 BRPM | BODY MASS INDEX: 34.5 KG/M2 | DIASTOLIC BLOOD PRESSURE: 78 MMHG | HEART RATE: 83 BPM | SYSTOLIC BLOOD PRESSURE: 130 MMHG | WEIGHT: 246.4 LBS | HEIGHT: 71 IN | OXYGEN SATURATION: 97 %

## 2023-10-17 DIAGNOSIS — R35.1 NOCTURIA: ICD-10-CM

## 2023-10-17 DIAGNOSIS — R39.9 LOWER URINARY TRACT SYMPTOMS (LUTS): Primary | ICD-10-CM

## 2023-10-17 PROCEDURE — 52000 CYSTOURETHROSCOPY: CPT | Performed by: UROLOGY

## 2023-10-17 PROCEDURE — 76872 US TRANSRECTAL: CPT | Performed by: UROLOGY

## 2023-10-17 PROCEDURE — 87086 URINE CULTURE/COLONY COUNT: CPT | Performed by: UROLOGY

## 2023-10-17 RX ORDER — TAMSULOSIN HYDROCHLORIDE 0.4 MG/1
0.4 CAPSULE ORAL
Qty: 60 CAPSULE | Refills: 5 | Status: SHIPPED | OUTPATIENT
Start: 2023-10-17

## 2023-10-17 NOTE — PATIENT INSTRUCTIONS
You had cystoscopy done in the office today. This means that we looked inside your urethra and bladder with a camera. You may see some blood in your urine for the next few days. This is normal. Please drink plenty of fluids. Call the office if you are passing large blood clots in your urine or if you are not able to urinate. It may burn when you urinate for the next few days. This is normal.    Please call the office if you have fevers or chills in the next few days. You will return to clinic in 2 months.

## 2023-10-17 NOTE — PROGRESS NOTES
Cystoscopy     Date/Time  10/17/2023 2:30 PM     Performed by  Yohana Benavides DO   Authorized by  Yohana Benavides DO     Universal Protocol:  Consent: Verbal consent obtained. Written consent obtained. Risks and benefits: risks, benefits and alternatives were discussed  Consent given by: patient  Time out: Immediately prior to procedure a "time out" was called to verify the correct patient, procedure, equipment, support staff and site/side marked as required. Timeout called at: 10/17/2023 2:56 PM.  Patient understanding: patient states understanding of the procedure being performed  Patient consent: the patient's understanding of the procedure matches consent given  Procedure consent: procedure consent matches procedure scheduled  Relevant documents: relevant documents present and verified  Patient identity confirmed: verbally with patient      Procedure Details:  Procedure type: cystoscopy    Additional Procedure Details: Procedure Details:  Procedure type: cystoscopy    Patient tolerance: Patient tolerated the procedure well with no immediate complications    Additional Procedure Details: Office Cystoscopy Procedure Note    Indication:    LUTS    Informed consent   The risks, benefits, complications, treatment options, and expected outcomes were discussed with the patient. The patient concurred with the proposed plan and provided informed consent. Anesthesia  Lidocaine jelly 2%    Antibiotic prophylaxis   None    Procedure  The patient was placed in the supineposition, was prepped and draped in the usual manner using sterile technique, and 2% lidocaine jelly instilled into the urethra. A 17 F flexible cystoscope was then inserted into the urethra and the urethra and bladder carefully examined.   The following findings were noted:    Findings:  Urethra:  Normal  Prostate:  mild lateral lobe hypertrophy, minimal median lobe, no lesions  Bladder:  Mild trabeculations throughout; no lesions; no stones  Ureteral orifices:  orthotopic  Other findings:  None, retroflexed view confirms    Specimens: None                 Complications:    None; patient tolerated the procedure well      Once cystoscopy portion of procedure was over, I performed transrectal prostate ultrasound. Patient placed in lateral decubitus position with right side up. Transrectal ultrasound probe introduced into rectum with slow steady pressure. This was assisted by use of lubricant jelly. Once probe was introduced, prostate was scanned through in its entirety in both axial and sagittal views. No hypoechoic lesions were visualized in prostate. Bilateral seminal vesicles were visualized. No significant intravesical median lobe was visualized. Prostate was measured at 3.99 cm x 4.37 cm x 2.90 cm, volume 26.52 g. Key pictures were taken and saved to chart using software. Probe was slowly removed from rectum. Patient returned to supine position. Patient tolerated procedure well.                    Disposition: To home            Condition: Stable    Plan:   -ucx sent, will follow up results and call pt if positive and see if needs abx  -started flomax 0.4 mg nightly  -asked pt to cut back on fluids after dinner  -RTC in 2 months to see if sx improved (hesitancy, nocturia x2)

## 2023-10-17 NOTE — LETTER
October 17, 2023     Marcie Villagran MD  [address] [place] [address]    Patient: Yogi Fajardo   YOB: 1955   Date of Visit: 10/17/2023       Dear Dr. Nichole Ours: Thank you for referring Yogi Fajardo to me for evaluation. Below are my notes for this consultation. If you have questions, please do not hesitate to call me. I look forward to following your patient along with you. Sincerely,        Hannah Noel DO        CC: No Recipients    Jolie Servin  10/17/2023  2:52 PM  Incomplete     Cystoscopy     Date/Time  10/17/2023 2:30 PM     Performed by  Hannah Noel DO   Authorized by  Hannah Noel DO     Universal Protocol:  Consent: Verbal consent obtained. Written consent obtained. Risks and benefits: risks, benefits and alternatives were discussed  Consent given by: patient  Time out: Immediately prior to procedure a "time out" was called to verify the correct patient, procedure, equipment, support staff and site/side marked as required. Timeout called at: 10/17/2023 2:49 PM.  Patient understanding: patient states understanding of the procedure being performed  Patient consent: the patient's understanding of the procedure matches consent given  Procedure consent: procedure consent matches procedure scheduled  Relevant documents: relevant documents present and verified  Patient identity confirmed: verbally with patient      Procedure Details:  Procedure type: cystoscopy    Patient tolerance: Patient tolerated the procedure well with no immediate complications    Additional Procedure Details: Office Cystoscopy Procedure Note    Indication:    LUTS    Informed consent   The risks, benefits, complications, treatment options, and expected outcomes were discussed with the patient. The patient concurred with the proposed plan and provided informed consent.     Anesthesia  Lidocaine jelly 2%    Antibiotic prophylaxis   None    Procedure  The patient was placed in the supineposition, was prepped and draped in the usual manner using sterile technique, and 2% lidocaine jelly instilled into the urethra. A 17 F flexible cystoscope was then inserted into the urethra and the urethra and bladder carefully examined. The following findings were noted:    Findings:  Urethra:  Normal  Prostate:  mild lateral lobe hypertrophy, minimal median lobe, no lesions  Bladder:  Mild trabeculations throughout; no lesions; no stones  Ureteral orifices:  orthotopic  Other findings:  None, retroflexed view confirms    Specimens: None                 Complications:    None; patient tolerated the procedure well      Once cystoscopy portion of procedure was over, I performed transrectal prostate ultrasound. Patient placed in lateral decubitus position with right side up. Transrectal ultrasound probe introduced into rectum with slow steady pressure. This was assisted by use of lubricant jelly. Once probe was introduced, prostate was scanned through in its entirety in both axial and sagittal views. No hypoechoic lesions were visualized in prostate. Bilateral seminal vesicles were visualized. No significant intravesical median lobe was visualized. Prostate was measured at 3.99 cm x 4.37 cm x 2.90 cm, volume 26.52 g. Key pictures were taken and saved to chart using software. Probe was slowly removed from rectum. Patient returned to supine position. Patient tolerated procedure well.                    Disposition: To home            Condition: Stable    Plan:   -ucx sent, will follow up results and call pt if positive and see if needs abx  -started flomax 0.4 mg nightly  -asked pt to cut back on fluids after dinner  -RTC in 2 months to see if sx improved (hesitancy, nocturia x2)

## 2023-10-18 LAB — BACTERIA UR CULT: NORMAL

## 2023-12-11 RX ORDER — DEXTROMETHORPHAN HYDROBROMIDE AND PROMETHAZINE HYDROCHLORIDE 15; 6.25 MG/5ML; MG/5ML
5 SYRUP ORAL 4 TIMES DAILY PRN
COMMUNITY
Start: 2023-12-02

## 2023-12-11 RX ORDER — PROMETHAZINE HYDROCHLORIDE, PHENYLEPHRINE HYDROCHLORIDE AND CODEINE PHOSPHATE 6.25; 5; 1 MG/5ML; MG/5ML; MG/5ML
SOLUTION ORAL EVERY 6 HOURS PRN
COMMUNITY
Start: 2023-12-02

## 2023-12-12 ENCOUNTER — OFFICE VISIT (OUTPATIENT)
Dept: GASTROENTEROLOGY | Facility: CLINIC | Age: 68
End: 2023-12-12
Payer: MEDICARE

## 2023-12-12 ENCOUNTER — OFFICE VISIT (OUTPATIENT)
Dept: UROLOGY | Facility: CLINIC | Age: 68
End: 2023-12-12
Payer: MEDICARE

## 2023-12-12 VITALS
DIASTOLIC BLOOD PRESSURE: 104 MMHG | WEIGHT: 245 LBS | HEART RATE: 81 BPM | BODY MASS INDEX: 34.3 KG/M2 | HEIGHT: 71 IN | OXYGEN SATURATION: 98 % | SYSTOLIC BLOOD PRESSURE: 156 MMHG

## 2023-12-12 VITALS
DIASTOLIC BLOOD PRESSURE: 88 MMHG | SYSTOLIC BLOOD PRESSURE: 123 MMHG | HEART RATE: 84 BPM | WEIGHT: 246 LBS | BODY MASS INDEX: 34.44 KG/M2 | OXYGEN SATURATION: 95 % | HEIGHT: 71 IN

## 2023-12-12 DIAGNOSIS — Z83.719 FAMILY HISTORY OF COLONIC POLYPS: ICD-10-CM

## 2023-12-12 DIAGNOSIS — Z12.5 PROSTATE CANCER SCREENING: ICD-10-CM

## 2023-12-12 DIAGNOSIS — E66.01 MORBID OBESITY DUE TO EXCESS CALORIES (HCC): ICD-10-CM

## 2023-12-12 DIAGNOSIS — Z86.010 HISTORY OF COLON POLYPS: Primary | ICD-10-CM

## 2023-12-12 DIAGNOSIS — N32.0 BLADDER-NECK OBSTRUCTION: ICD-10-CM

## 2023-12-12 DIAGNOSIS — R13.19 ESOPHAGEAL DYSPHAGIA: ICD-10-CM

## 2023-12-12 DIAGNOSIS — R39.9 LOWER URINARY TRACT SYMPTOMS (LUTS): Primary | ICD-10-CM

## 2023-12-12 LAB
POST-VOID RESIDUAL VOLUME, ML POC: 94 ML
SL AMB  POCT GLUCOSE, UA: NORMAL
SL AMB LEUKOCYTE ESTERASE,UA: NORMAL
SL AMB POCT BILIRUBIN,UA: NORMAL
SL AMB POCT BLOOD,UA: NORMAL
SL AMB POCT CLARITY,UA: CLEAR
SL AMB POCT COLOR,UA: YELLOW
SL AMB POCT KETONES,UA: NORMAL
SL AMB POCT NITRITE,UA: NORMAL
SL AMB POCT PH,UA: 7
SL AMB POCT SPECIFIC GRAVITY,UA: 1
SL AMB POCT URINE PROTEIN: NORMAL
SL AMB POCT UROBILINOGEN: 0.2

## 2023-12-12 PROCEDURE — 51798 US URINE CAPACITY MEASURE: CPT | Performed by: UROLOGY

## 2023-12-12 PROCEDURE — 81002 URINALYSIS NONAUTO W/O SCOPE: CPT | Performed by: UROLOGY

## 2023-12-12 PROCEDURE — 99214 OFFICE O/P EST MOD 30 MIN: CPT | Performed by: UROLOGY

## 2023-12-12 PROCEDURE — 99203 OFFICE O/P NEW LOW 30 MIN: CPT | Performed by: INTERNAL MEDICINE

## 2023-12-12 RX ORDER — DOXYCYCLINE HYCLATE 100 MG
TABLET ORAL
COMMUNITY
Start: 2023-11-24

## 2023-12-12 RX ORDER — BENZONATATE 200 MG/1
CAPSULE ORAL
COMMUNITY
Start: 2023-11-24

## 2023-12-12 NOTE — LETTER
December 12, 2023     Kinza Peres MD  [address] [place] [address]    Patient: Harinder Sweeney   YOB: 1955   Date of Visit: 12/12/2023       Dear Dr. Pratik Jacobsen: Thank you for referring Harinder Sweeney to me for evaluation. Below are my notes for this consultation. If you have questions, please do not hesitate to call me. I look forward to following your patient along with you. Sincerely,        Mendez Castaneda DO        CC: No Recipients    Jolie Ramirez  12/12/2023  8:15 AM  Sign when Signing Visit  UROLOGY FOLLOW-UP ENCOUNTER    Harinder Sweeney is a 80 y.o. male with voiding symptoms    Pertinent PMH/PSH: open appy 1982, Whipple around 2017 for pancreatic cancer    ASA81 for prevention    LUTS: hesitancy, nocturia x2    Cysto 10/17/23:  Urethra:                      Normal  Prostate:                    mild lateral lobe hypertrophy, minimal median lobe, no lesions  Bladder:                     Mild trabeculations throughout; no lesions; no stones  Ureteral orifices:       orthotopic  Other findings:          None, retroflexed view confirms    TRUS 10/17/23: prostate 26.52 g    Started on Flomax in October 2023. Lifestyle modifications. PVR 94 cc on 12/12/23    U dip neg 12/12/23      Assessment and plan:     BPH/LUTS    Patient was seen in October 2023 for cystoscopy for lower urinary tract symptoms which are mostly hesitancy and nocturia x 2. At that time he had only mildly enlarged prostate, measured around 26 g. I started him on Flomax at that time. I also told him to cut back on fluids after dinner. Since then, he is happy with the Flomax. No side effects. He is cutting back on fluids after dinner. He is overall happy with his voiding. PVR was 94 cc in the office today. This result was reviewed with the patient. Explained to him that he is emptying his bladder fairly well.   He believes that there are times where he empties his bladder even better later in the day. I told him to let us know if there is any concerns for continued difficulty emptying. He said that he does not have concerns about this. Urine dip in the office today was negative for infection. Results were reviewed with the patient. I explained that there are no concerns for infection at this time. At this time, we will continue him on Flomax. Prostate cancer screening    His PSA in December 2023 was less than 1. We will get yearly PSAs at this point. His next PSA will be in the summer 2024. I ordered this for him today. PLAN  -RTC in July 2024 with PSA  -Continue Flomax        Portions of the above record have been created with voice recognition software. Occasional wrong word or "sound alike" substitution may have occurred due to the inherent limitations of voice recognition software. Read the chart carefully and recognize, using context, where substitution may have occurred.       Krupa Pouch, DO        Chief Complaint     BPH/LUTS      History of Present Illness     See summary above    No fevers or chills          The following portions of the patient's history were reviewed and updated as appropriate: allergies, current medications, past family history, past medical history, past social history, past surgical history and problem list.        AUA SYMPTOM SCORE      Flowsheet Row Most Recent Value   AUA SYMPTOM SCORE    How often have you had a sensation of not emptying your bladder completely after you finished urinating? 4 (P)     How often have you had to urinate again less than two hours after you finished urinating? 4 (P)     How often have you found you stopped and started again several times when you urinate? 4 (P)     How often have you found it difficult to postpone urination? 4 (P)     How often have you had a weak urinary stream? 4 (P)     How often have you had to push or strain to begin urination? 4 (P)     How many times did you most typically get up to urinate from the time you went to bed at night until the time you got up in the morning? 1 (P)     Quality of Life: If you were to spend the rest of your life with your urinary condition just the way it is now, how would you feel about that? 4 (P)     AUA SYMPTOM SCORE 25 (P)             Review of Systems     Review of Systems   Constitutional:  Negative for chills and fever. Respiratory:  Negative for cough and shortness of breath. Gastrointestinal:  Negative for abdominal pain. Genitourinary:  Negative for dysuria and hematuria. Neurological:  Negative for dizziness and headaches. Psychiatric/Behavioral:  Negative for agitation and behavioral problems. Allergies     Allergies   Allergen Reactions   • Iodine - Food Allergy Hives   • Shellfish-Derived Products - Food Allergy Hives and Anaphylaxis   • Glucosamine Hives   • Penicillins Other (See Comments)     Rt. Shoulder was frozen   • Iodinated Contrast Media Hives and Itching       Physical Exam     Physical Exam  Constitutional:       General: He is not in acute distress. HENT:      Head: Normocephalic and atraumatic. Pulmonary:      Effort: Pulmonary effort is normal. No respiratory distress. Abdominal:      General: Abdomen is flat. Palpations: Abdomen is soft. Tenderness: There is no right CVA tenderness or left CVA tenderness. Skin:     General: Skin is warm and dry. Neurological:      General: No focal deficit present. Mental Status: He is alert and oriented to person, place, and time.    Psychiatric:         Mood and Affect: Mood normal.         Behavior: Behavior normal.           Vital Signs  Vitals:    12/12/23 0747   BP: (!) 156/104   Pulse: 81   SpO2: 98%   Weight: 111 kg (245 lb)   Height: 5' 11" (1.803 m)         Current Medications       Current Outpatient Medications:   •  ALPRAZolam (XANAX) 0.5 mg tablet, as needed, Disp: , Rfl:   •  aspirin 81 MG tablet, Take 81 mg by mouth daily, Disp: , Rfl:   •  benzonatate (TESSALON) 200 MG capsule, TAKE 1 CAPSULE BY MOUTH THREE TIMES A DAY AS NEEDED FOR COUGH, Disp: , Rfl:   •  BIOTIN PO, Take by mouth, Disp: , Rfl:   •  cetirizine (ZyrTEC) 10 mg tablet, Take 10 mg by mouth daily, Disp: , Rfl:   •  doxycycline hyclate (VIBRA-TABS) 100 mg tablet, TAKE 1 TABLET BY MOUTH TWICE A DAY FOR 7 DAYS, Disp: , Rfl:   •  FLUoxetine (PROzac) 40 MG capsule, Take 40 mg by mouth daily, Disp: , Rfl:   •  hydrocortisone 2.5 % cream, hydrocortisone 2.5 % topical cream, Disp: , Rfl:   •  ketoconazole (NIZORAL) 2 % shampoo, ketoconazole 2 % shampoo, Disp: , Rfl:   •  losartan (COZAAR) 50 mg tablet, Take 50 mg by mouth, Disp: , Rfl:   •  metroNIDAZOLE (METROCREAM) 0.75 % cream, APPLY TO RASH ON FACE TWICE A DAY, Disp: , Rfl:   •  multivitamin (THERAGRAN) TABS, Take 1 tablet by mouth daily, Disp: , Rfl:   •  pancrelipase, Lip-Prot-Amyl, (CREON) 24,000 units, TAKE 2 CAPSULES BY MOUTH 3 (THREE) TIMES A DAY WITH MEALS., Disp: , Rfl:   •  promethazine-dextromethorphan (PHENERGAN-DM) 6.25-15 mg/5 mL oral syrup, Take 5 mL by mouth 4 (four) times a day as needed, Disp: , Rfl:   •  Promethazine-Phenyleph-Codeine 6.25-5-10 MG/5ML SYRP, Take by mouth every 6 (six) hours as needed, Disp: , Rfl:   •  tamsulosin (FLOMAX) 0.4 mg, Take 1 capsule (0.4 mg total) by mouth daily with dinner, Disp: 60 capsule, Rfl: 5      Active Problems     Patient Active Problem List   Diagnosis   • Erectile dysfunction due to arterial insufficiency   • Benign localized prostatic hyperplasia with lower urinary tract symptoms (LUTS)   • Person consulting for explanation of examination or test finding   • Morbid obesity due to excess calories (HCC)   • Essential hypertension   • Basal cell carcinoma (BCC) of ala nasi   • Erectile dysfunction   • Chronic left hip pain   • Greater trochanteric pain syndrome   • Knee pain   • Trochanteric bursitis   • Low back pain   • Mixed hyperlipidemia         Past Medical History Past Medical History:   Diagnosis Date   • Hypertension    • Squamous cell skin cancer 09/19/2022    left lateral neck         Surgical History     Past Surgical History:   Procedure Laterality Date   • ABDOMINAL SURGERY     • APPENDECTOMY     • COLONOSCOPY      2018   • FACIAL RECONSTRUCTION SURGERY     • MOHS SURGERY  10/12/2022    left lateral neck .  Dr. Chey Weller   • OTHER SURGICAL HISTORY      skin cancer removal   • TONSILLECTOMY     • WHIPPLE PROCEDURE W/ LAPAROSCOPY           Family History     Family History   Problem Relation Age of Onset   • Hypertension Mother    • Heart disease Mother    • Osteoarthritis Mother    • Cancer Father    • Throat cancer Father          Social History     Social History    Social History     Tobacco Use   Smoking Status Former   • Passive exposure: Past   Smokeless Tobacco Never   Tobacco Comments    quit 25 years ago         Pertinent Lab Values     Lab Results   Component Value Date    CREATININE 0.80 09/15/2019       Lab Results   Component Value Date    PSA 0.90 08/11/2023    PSA 0.8 09/07/2022    PSA 0.6 03/04/2021         Pertinent Imaging     N/A    Pertinent Pathology     N/A

## 2023-12-12 NOTE — PROGRESS NOTES
[facility] - Outpatient Consultation  Edi Moreno 80 y.o. male MRN: [id number]  Encounter: [id number]          ASSESSMENT AND PLAN:      1. History of colon polyps  - Colonoscopy; Future    2. Family history of colonic polyps  - Colonoscopy; Future    3. Esophageal dysphagia  - EGD; Future    4. Morbid obesity due to excess calories ([address])    ______________________________________________________________________    HPI: Ashely Thornton is a 81-year-old male who comes to the office today to schedule a routine surveillance colonoscopy based upon a personal history of colon polyps as well as a family history of colon polyps which involved his father. He denies any current problems with abdominal pain, nausea, vomiting, diarrhea, constipation, change in bowel habits, heartburn, rectal bleeding, melena. He does complain of dysphagia however and this has been a problem has been ongoing for many months now. The episodes occur with solid foods approximately once every 4 to 6 weeks. His last esophagogastroduodenoscopy was performed 6 years ago and it was apparently unremarkable. He had a colonoscopy performed at the same time which showed colon polyps. Patient also was diagnosed as having an ampullary tumor 6 years ago. I made this diagnosis. The patient was referred on to a surgical oncologist and a Whipple's procedure was performed 6 years ago. He denies any current weight loss. REVIEW OF SYSTEMS:    CONSTITUTIONAL: Denies any fever, chills, rigors, and weight loss. HEENT: No earache or tinnitus. Denies hearing loss or visual disturbances. CARDIOVASCULAR: No chest pain or palpitations. RESPIRATORY: Denies any cough, hemoptysis, shortness of breath or dyspnea on exertion. GASTROINTESTINAL: As noted in the History of Present Illness. GENITOURINARY: No problems with urination. Denies any hematuria or dysuria. NEUROLOGIC: No dizziness or vertigo, denies headaches.    MUSCULOSKELETAL: Denies any muscle or joint pain. SKIN: Denies skin rashes or itching. ENDOCRINE: Denies excessive thirst. Denies intolerance to heat or cold. PSYCHOSOCIAL: Denies depression or anxiety. Denies any recent memory loss. Historical Information   Past Medical History:   Diagnosis Date    Hypertension     Squamous cell skin cancer 09/19/2022    left lateral neck     Past Surgical History:   Procedure Laterality Date    ABDOMINAL SURGERY      APPENDECTOMY      COLONOSCOPY      2018    FACIAL RECONSTRUCTION SURGERY      MOHS SURGERY  10/12/2022    left lateral neck .  Dr. Yasmeen Naranjo      skin cancer removal    TONSILLECTOMY      WHIPPLE PROCEDURE W/ LAPAROSCOPY       Social History   Social History     Substance and Sexual Activity   Alcohol Use No     Social History     Substance and Sexual Activity   Drug Use No     Social History     Tobacco Use   Smoking Status Former    Passive exposure: Past   Smokeless Tobacco Never   Tobacco Comments    quit 25 years ago     Family History   Problem Relation Age of Onset    Hypertension Mother     Heart disease Mother     Osteoarthritis Mother     Cancer Father     Throat cancer Father        Meds/Allergies       Current Outpatient Medications:     ALPRAZolam (XANAX) 0.5 mg tablet    aspirin 81 MG tablet    benzonatate (TESSALON) 200 MG capsule    BIOTIN PO    cetirizine (ZyrTEC) 10 mg tablet    doxycycline hyclate (VIBRA-TABS) 100 mg tablet    FLUoxetine (PROzac) 40 MG capsule    hydrocortisone 2.5 % cream    ketoconazole (NIZORAL) 2 % shampoo    losartan (COZAAR) 50 mg tablet    metroNIDAZOLE (METROCREAM) 0.75 % cream    multivitamin (THERAGRAN) TABS    pancrelipase, Lip-Prot-Amyl, (CREON) 24,000 units    promethazine-dextromethorphan (PHENERGAN-DM) 6.25-15 mg/5 mL oral syrup    Promethazine-Phenyleph-Codeine 6.25-5-10 MG/5ML SYRP    tamsulosin (FLOMAX) 0.4 mg    Allergies   Allergen Reactions    Iodine - Food Allergy Hives    Shellfish-Derived Products - Food Allergy Hives and Anaphylaxis    Glucosamine Hives    Penicillins Other (See Comments)     Rt. Shoulder was frozen    Iodinated Contrast Media Hives and Itching           Objective     Blood pressure 123/88, pulse 84, height 5' 11" (1.803 m), weight 112 kg (246 lb), SpO2 95 %. Body mass index is 34.31 kg/m². PHYSICAL EXAM:      General Appearance:   Alert, cooperative, no distress   HEENT:   Normocephalic, atraumatic, anicteric. Neck:  Supple, symmetrical, trachea midline   Lungs:   Clear to auscultation bilaterally; no rales, rhonchi or wheezing; respirations unlabored    Heart[de-identified]   Regular rate and rhythm; no murmur, rub, or gallop. Abdomen:   Soft, non-tender, non-distended; normal bowel sounds; no masses, no organomegaly    Genitalia:   Deferred    Rectal:   Deferred    Extremities:  No cyanosis, clubbing or edema    Pulses:  2+ and symmetric    Skin:  No jaundice, rashes, or lesions    Lymph nodes:  No palpable cervical lymphadenopathy        Lab Results:   Office Visit on 12/12/2023   Component Date Value    LEUKOCYTE ESTERASE,UA 12/12/2023 -     Rosia Henderson 12/12/2023 -     SL AMB POCT UROBILINOGEN 12/12/2023 0.2     POCT URINE PROTEIN 12/12/2023 -      [facility] 12/12/2023 7.0     BLOOD,UA 12/12/2023 -     SPECIFIC GRAVITY,UA 12/12/2023 1.005     KETONES,UA 12/12/2023 -     BILIRUBIN,UA 12/12/2023 -     GLUCOSE, UA 12/12/2023 -      COLOR,UA 12/12/2023 Yellow     CLARITY,UA 12/12/2023 Clear     POST-VOID RESIDUAL VOLUM* 12/12/2023 94          Radiology Results:   XR chest pa & lateral    Result Date: 12/1/2023  Narrative: Chest x-ray HISTORY: cough. TECHNIQUE: Frontal and lateral views. Prior study 07/16/2022. FINDINGS:  Abdominal surgical clips are noted. There is no lobar or segmental consolidation. No evidence of acute pulmonary edema. The cardiopericardial silhouette is normal for size. There is no visible pleural effusion.       Impression: IMPRESSION: No consolidation or evidence of acute pulmonary edema.  Workstation:[id number]

## 2023-12-12 NOTE — PATIENT INSTRUCTIONS
Scheduled date of EGD/colonoscopy (as of today):1/23/24  Physician performing EGD/colonoscopy:Yeimi  Location of EGD/colonoscopy:[facility]  Desired bowel prep reviewed with patient:Michael/Miralax  Instructions reviewed with patient by:Payam alvarenga  Clearances:   none

## 2023-12-12 NOTE — PROGRESS NOTES
UROLOGY FOLLOW-UP ENCOUNTER    Yogi Fajardo is a 80 y.o. male with voiding symptoms    Pertinent PMH/PSH: open appy 1982, Whipple around 2017 for pancreatic cancer    ASA81 for prevention    LUTS: hesitancy, nocturia x2    Cysto 10/17/23:  Urethra:                      Normal  Prostate:                    mild lateral lobe hypertrophy, minimal median lobe, no lesions  Bladder:                     Mild trabeculations throughout; no lesions; no stones  Ureteral orifices:       orthotopic  Other findings:          None, retroflexed view confirms    TRUS 10/17/23: prostate 26.52 g    Started on Flomax in October 2023. Lifestyle modifications. PVR 94 cc on 12/12/23    U dip neg 12/12/23      Assessment and plan:     BPH/LUTS    Patient was seen in October 2023 for cystoscopy for lower urinary tract symptoms which are mostly hesitancy and nocturia x 2. At that time he had only mildly enlarged prostate, measured around 26 g. I started him on Flomax at that time. I also told him to cut back on fluids after dinner. Since then, he is happy with the Flomax. No side effects. He is cutting back on fluids after dinner. He is overall happy with his voiding. PVR was 94 cc in the office today. This result was reviewed with the patient. Explained to him that he is emptying his bladder fairly well. He believes that there are times where he empties his bladder even better later in the day. I told him to let us know if there is any concerns for continued difficulty emptying. He said that he does not have concerns about this. Urine dip in the office today was negative for infection. Results were reviewed with the patient. I explained that there are no concerns for infection at this time. At this time, we will continue him on Flomax. Prostate cancer screening    His PSA in December 2023 was less than 1. We will get yearly PSAs at this point. His next PSA will be in the summer 2024.   I ordered this for him today. PLAN  -RTC in July 2024 with PSA  -Continue Flomax        Portions of the above record have been created with voice recognition software. Occasional wrong word or "sound alike" substitution may have occurred due to the inherent limitations of voice recognition software. Read the chart carefully and recognize, using context, where substitution may have occurred. Dio Crowder DO        Chief Complaint     BPH/LUTS      History of Present Illness     See summary above    No fevers or chills          The following portions of the patient's history were reviewed and updated as appropriate: allergies, current medications, past family history, past medical history, past social history, past surgical history and problem list.        AUA SYMPTOM SCORE      Flowsheet Row Most Recent Value   AUA SYMPTOM SCORE    How often have you had a sensation of not emptying your bladder completely after you finished urinating? 4 (P)     How often have you had to urinate again less than two hours after you finished urinating? 4 (P)     How often have you found you stopped and started again several times when you urinate? 4 (P)     How often have you found it difficult to postpone urination? 4 (P)     How often have you had a weak urinary stream? 4 (P)     How often have you had to push or strain to begin urination? 4 (P)     How many times did you most typically get up to urinate from the time you went to bed at night until the time you got up in the morning? 1 (P)     Quality of Life: If you were to spend the rest of your life with your urinary condition just the way it is now, how would you feel about that? 4 (P)     AUA SYMPTOM SCORE 25 (P)             Review of Systems     Review of Systems   Constitutional:  Negative for chills and fever. Respiratory:  Negative for cough and shortness of breath. Gastrointestinal:  Negative for abdominal pain.    Genitourinary:  Negative for dysuria and hematuria. Neurological:  Negative for dizziness and headaches. Psychiatric/Behavioral:  Negative for agitation and behavioral problems. Allergies     Allergies   Allergen Reactions    Iodine - Food Allergy Hives    Shellfish-Derived Products - Food Allergy Hives and Anaphylaxis    Glucosamine Hives    Penicillins Other (See Comments)     Rt. Shoulder was frozen    Iodinated Contrast Media Hives and Itching       Physical Exam     Physical Exam  Constitutional:       General: He is not in acute distress. HENT:      Head: Normocephalic and atraumatic. Pulmonary:      Effort: Pulmonary effort is normal. No respiratory distress. Abdominal:      General: Abdomen is flat. Palpations: Abdomen is soft. Tenderness: There is no right CVA tenderness or left CVA tenderness. Skin:     General: Skin is warm and dry. Neurological:      General: No focal deficit present. Mental Status: He is alert and oriented to person, place, and time.    Psychiatric:         Mood and Affect: Mood normal.         Behavior: Behavior normal.           Vital Signs  Vitals:    12/12/23 0747   BP: (!) 156/104   Pulse: 81   SpO2: 98%   Weight: 111 kg (245 lb)   Height: 5' 11" (1.803 m)         Current Medications       Current Outpatient Medications:     ALPRAZolam (XANAX) 0.5 mg tablet, as needed, Disp: , Rfl:     aspirin 81 MG tablet, Take 81 mg by mouth daily, Disp: , Rfl:     benzonatate (TESSALON) 200 MG capsule, TAKE 1 CAPSULE BY MOUTH THREE TIMES A DAY AS NEEDED FOR COUGH, Disp: , Rfl:     BIOTIN PO, Take by mouth, Disp: , Rfl:     cetirizine (ZyrTEC) 10 mg tablet, Take 10 mg by mouth daily, Disp: , Rfl:     doxycycline hyclate (VIBRA-TABS) 100 mg tablet, TAKE 1 TABLET BY MOUTH TWICE A DAY FOR 7 DAYS, Disp: , Rfl:     FLUoxetine (PROzac) 40 MG capsule, Take 40 mg by mouth daily, Disp: , Rfl:     hydrocortisone 2.5 % cream, hydrocortisone 2.5 % topical cream, Disp: , Rfl:     ketoconazole (NIZORAL) 2 % shampoo, ketoconazole 2 % shampoo, Disp: , Rfl:     losartan (COZAAR) 50 mg tablet, Take 50 mg by mouth, Disp: , Rfl:     metroNIDAZOLE (METROCREAM) 0.75 % cream, APPLY TO RASH ON FACE TWICE A DAY, Disp: , Rfl:     multivitamin (THERAGRAN) TABS, Take 1 tablet by mouth daily, Disp: , Rfl:     pancrelipase, Lip-Prot-Amyl, (CREON) 24,000 units, TAKE 2 CAPSULES BY MOUTH 3 (THREE) TIMES A DAY WITH MEALS., Disp: , Rfl:     promethazine-dextromethorphan (PHENERGAN-DM) 6.25-15 mg/5 mL oral syrup, Take 5 mL by mouth 4 (four) times a day as needed, Disp: , Rfl:     Promethazine-Phenyleph-Codeine 6.25-5-10 MG/5ML SYRP, Take by mouth every 6 (six) hours as needed, Disp: , Rfl:     tamsulosin (FLOMAX) 0.4 mg, Take 1 capsule (0.4 mg total) by mouth daily with dinner, Disp: 60 capsule, Rfl: 5      Active Problems     Patient Active Problem List   Diagnosis    Erectile dysfunction due to arterial insufficiency    Benign localized prostatic hyperplasia with lower urinary tract symptoms (LUTS)    Person consulting for explanation of examination or test finding    Morbid obesity due to excess calories (HCC)    Essential hypertension    Basal cell carcinoma (BCC) of ala nasi    Erectile dysfunction    Chronic left hip pain    Greater trochanteric pain syndrome    Knee pain    Trochanteric bursitis    Low back pain    Mixed hyperlipidemia         Past Medical History     Past Medical History:   Diagnosis Date    Hypertension     Squamous cell skin cancer 09/19/2022    left lateral neck         Surgical History     Past Surgical History:   Procedure Laterality Date    ABDOMINAL SURGERY      APPENDECTOMY      COLONOSCOPY      2018    FACIAL RECONSTRUCTION SURGERY      MOHS SURGERY  10/12/2022    left lateral neck .  Dr. Jaya Crisostomo      skin cancer removal    TONSILLECTOMY      WHIPPLE PROCEDURE W/ LAPAROSCOPY           Family History     Family History   Problem Relation Age of Onset    Hypertension Mother     Heart disease Mother     Osteoarthritis Mother     Cancer Father     Throat cancer Father          Social History     Social History     Social History     Tobacco Use   Smoking Status Former    Passive exposure: Past   Smokeless Tobacco Never   Tobacco Comments    quit 25 years ago         Pertinent Lab Values     Lab Results   Component Value Date    CREATININE 0.80 09/15/2019       Lab Results   Component Value Date    PSA 0.90 08/11/2023    PSA 0.8 09/07/2022    PSA 0.6 03/04/2021         Pertinent Imaging     N/A    Pertinent Pathology     N/A

## 2023-12-12 NOTE — PATIENT INSTRUCTIONS
Keep taking your Flomax    Call us if any issues peeing    See us back in July or August 2024 with PSA blood test a few days before

## 2024-01-23 ENCOUNTER — HOSPITAL ENCOUNTER (OUTPATIENT)
Dept: GASTROENTEROLOGY | Facility: HOSPITAL | Age: 69
Setting detail: OUTPATIENT SURGERY
Discharge: HOME | End: 2024-01-23
Attending: INTERNAL MEDICINE
Payer: MEDICARE

## 2024-01-23 ENCOUNTER — ANESTHESIA EVENT (OUTPATIENT)
Dept: GASTROENTEROLOGY | Facility: HOSPITAL | Age: 69
End: 2024-01-23

## 2024-01-23 ENCOUNTER — TREATMENT (OUTPATIENT)
Dept: GASTROENTEROLOGY | Facility: CLINIC | Age: 69
End: 2024-01-23

## 2024-01-23 ENCOUNTER — ANESTHESIA (OUTPATIENT)
Dept: GASTROENTEROLOGY | Facility: HOSPITAL | Age: 69
End: 2024-01-23

## 2024-01-23 VITALS
HEART RATE: 56 BPM | SYSTOLIC BLOOD PRESSURE: 119 MMHG | HEIGHT: 71 IN | OXYGEN SATURATION: 96 % | WEIGHT: 242.95 LBS | BODY MASS INDEX: 34.01 KG/M2 | RESPIRATION RATE: 15 BRPM | TEMPERATURE: 98.3 F | DIASTOLIC BLOOD PRESSURE: 67 MMHG

## 2024-01-23 DIAGNOSIS — Z83.719 FAMILY HISTORY OF COLONIC POLYPS: ICD-10-CM

## 2024-01-23 DIAGNOSIS — Z12.11 COLON CANCER SCREENING: Primary | ICD-10-CM

## 2024-01-23 DIAGNOSIS — R13.19 ESOPHAGEAL DYSPHAGIA: ICD-10-CM

## 2024-01-23 DIAGNOSIS — Z86.010 HISTORY OF COLON POLYPS: ICD-10-CM

## 2024-01-23 PROCEDURE — C1769 GUIDE WIRE: HCPCS

## 2024-01-23 PROCEDURE — 43248 EGD GUIDE WIRE INSERTION: CPT | Performed by: INTERNAL MEDICINE

## 2024-01-23 PROCEDURE — G0121 COLON CA SCRN NOT HI RSK IND: HCPCS | Performed by: INTERNAL MEDICINE

## 2024-01-23 RX ORDER — LIDOCAINE HYDROCHLORIDE 20 MG/ML
INJECTION, SOLUTION EPIDURAL; INFILTRATION; INTRACAUDAL; PERINEURAL AS NEEDED
Status: DISCONTINUED | OUTPATIENT
Start: 2024-01-23 | End: 2024-01-23

## 2024-01-23 RX ORDER — PROPOFOL 10 MG/ML
INJECTION, EMULSION INTRAVENOUS AS NEEDED
Status: DISCONTINUED | OUTPATIENT
Start: 2024-01-23 | End: 2024-01-23

## 2024-01-23 RX ORDER — SODIUM CHLORIDE, SODIUM LACTATE, POTASSIUM CHLORIDE, CALCIUM CHLORIDE 600; 310; 30; 20 MG/100ML; MG/100ML; MG/100ML; MG/100ML
INJECTION, SOLUTION INTRAVENOUS CONTINUOUS PRN
Status: DISCONTINUED | OUTPATIENT
Start: 2024-01-23 | End: 2024-01-23

## 2024-01-23 RX ADMIN — PROPOFOL 20 MG: 10 INJECTION, EMULSION INTRAVENOUS at 09:52

## 2024-01-23 RX ADMIN — PROPOFOL 180 MG: 10 INJECTION, EMULSION INTRAVENOUS at 09:20

## 2024-01-23 RX ADMIN — PROPOFOL 20 MG: 10 INJECTION, EMULSION INTRAVENOUS at 09:22

## 2024-01-23 RX ADMIN — PROPOFOL 20 MG: 10 INJECTION, EMULSION INTRAVENOUS at 10:01

## 2024-01-23 RX ADMIN — PROPOFOL 30 MG: 10 INJECTION, EMULSION INTRAVENOUS at 09:45

## 2024-01-23 RX ADMIN — PROPOFOL 50 MG: 10 INJECTION, EMULSION INTRAVENOUS at 09:23

## 2024-01-23 RX ADMIN — PROPOFOL 20 MG: 10 INJECTION, EMULSION INTRAVENOUS at 09:57

## 2024-01-23 RX ADMIN — PROPOFOL 20 MG: 10 INJECTION, EMULSION INTRAVENOUS at 09:30

## 2024-01-23 RX ADMIN — PROPOFOL 30 MG: 10 INJECTION, EMULSION INTRAVENOUS at 09:28

## 2024-01-23 RX ADMIN — PROPOFOL 40 MG: 10 INJECTION, EMULSION INTRAVENOUS at 09:36

## 2024-01-23 RX ADMIN — LIDOCAINE HYDROCHLORIDE 100 MG: 20 INJECTION, SOLUTION EPIDURAL; INFILTRATION; INTRACAUDAL at 09:20

## 2024-01-23 RX ADMIN — PROPOFOL 20 MG: 10 INJECTION, EMULSION INTRAVENOUS at 09:41

## 2024-01-23 RX ADMIN — PROPOFOL 50 MG: 10 INJECTION, EMULSION INTRAVENOUS at 09:48

## 2024-01-23 RX ADMIN — PROPOFOL 80 MG: 10 INJECTION, EMULSION INTRAVENOUS at 09:25

## 2024-01-23 RX ADMIN — SODIUM CHLORIDE, SODIUM LACTATE, POTASSIUM CHLORIDE, AND CALCIUM CHLORIDE: .6; .31; .03; .02 INJECTION, SOLUTION INTRAVENOUS at 09:08

## 2024-01-23 NOTE — H&P
"History and Physical -  [facility]  Raul Otto 68 y.o. male MRN: [id number]      HPI: Raul Otto is a 68 y.o. year old male who presents for the evaluation of dysphagia, personal history of colon polyp, and a family history for colon polyp      REVIEW OF SYSTEMS: Per the HPI, and otherwise unremarkable.    Historical Information   Past Medical History:   Diagnosis Date    Hypertension     Squamous cell skin cancer 09/19/2022    left lateral neck     Past Surgical History:   Procedure Laterality Date    ABDOMINAL SURGERY      APPENDECTOMY      COLONOSCOPY      2018    FACIAL RECONSTRUCTION SURGERY      MOHS SURGERY  10/12/2022    left lateral neck . Dr. Madrigal    OTHER SURGICAL HISTORY      skin cancer removal    TONSILLECTOMY      WHIPPLE PROCEDURE W/ LAPAROSCOPY       Social History   Social History     Substance and Sexual Activity   Alcohol Use No     Social History     Substance and Sexual Activity   Drug Use No     Social History     Tobacco Use   Smoking Status Former    Passive exposure: Past   Smokeless Tobacco Never   Tobacco Comments    quit 25 years ago     Family History   Problem Relation Age of Onset    Hypertension Mother     Heart disease Mother     Osteoarthritis Mother     Cancer Father     Throat cancer Father        Meds/Allergies     (Not in a hospital admission)      Allergies   Allergen Reactions    Iodine - Food Allergy Hives    Shellfish-Derived Products - Food Allergy Hives and Anaphylaxis    Glucosamine Hives    Penicillins Other (See Comments)     Rt. Shoulder was frozen    Iodinated Contrast Media Hives and Itching       Objective     Blood pressure 109/72, pulse 63, temperature 97.5 °F (36.4 °C), temperature source Temporal, resp. rate 16, height 5' 11\" (1.803 m), weight 110 kg (242 lb 15.2 oz), SpO2 94%.      PHYSICAL EXAM    Gen: NAD  CV: RRR  CHEST: Clear  ABD: soft, NT/ND  EXT: no edema      ASSESSMENT/PLAN:  This is a 68 y.o. year old male here for EGD, " colonoscopy, and he is stable and optimized for his procedure.

## 2024-01-23 NOTE — ANESTHESIA PREPROCEDURE EVALUATION
Procedure:  COLONOSCOPY  EGD    Relevant Problems   CARDIO   (+) Essential hypertension   (+) Mixed hyperlipidemia      /RENAL   (+) Benign localized prostatic hyperplasia with lower urinary tract symptoms (LUTS)      MUSCULOSKELETAL   (+) Low back pain      FACIAL RECONSTRUCTION SURGERY APPENDECTOMY   TONSILLECTOMY COLONOSCOPY   ABDOMINAL SURGERY WHIPPLE PROCEDURE W/ LAPAROSCOPY   OTHER SURGICAL HISTORY MOHS SURGERY     Physical Exam    Airway    Mallampati score: III  TM Distance: >3 FB  Neck ROM: full     Dental    upper dentures    Cardiovascular  Cardiovascular exam normal    Pulmonary  Pulmonary exam normal     Other Findings        Anesthesia Plan  ASA Score- 2     Anesthesia Type- IV sedation with anesthesia with ASA Monitors.         Additional Monitors:     Airway Plan:            Plan Factors-Exercise tolerance (METS): >4 METS.    Chart reviewed. EKG reviewed. Imaging results reviewed. Existing labs reviewed. Patient summary reviewed.                  Induction- intravenous.    Postoperative Plan-     Informed Consent- Anesthetic plan and risks discussed with patient.  I personally reviewed this patient with the CRNA. Discussed and agreed on the Anesthesia Plan with the CRNA..

## 2024-01-23 NOTE — ANESTHESIA POSTPROCEDURE EVALUATION
Post-Op Assessment Note    CV Status:  Stable  Pain Score: 0    Pain management: adequate       Mental Status:  Alert and awake   Hydration Status:  Euvolemic   PONV Controlled:  Controlled   Airway Patency:  Patent     Post Op Vitals Reviewed: Yes    No anethesia notable event occurred.    Staff: CRNA               BP 95/61 (01/23/24 1008)    Temp 98.3 °F (36.8 °C) (01/23/24 1008)    Pulse 66 (01/23/24 1008)   Resp 17 (01/23/24 1008)    SpO2 94 % (01/23/24 1008)

## 2024-02-06 ENCOUNTER — TELEPHONE (OUTPATIENT)
Dept: GASTROENTEROLOGY | Facility: CLINIC | Age: 69
End: 2024-02-06

## 2024-02-06 DIAGNOSIS — R10.84 GENERALIZED ABDOMINAL PAIN: Primary | ICD-10-CM

## 2024-02-06 DIAGNOSIS — Z12.11 COLON CANCER SCREENING: Primary | ICD-10-CM

## 2024-02-06 NOTE — TELEPHONE ENCOUNTER
----- Message from Rosa Isela Au sent at 2/6/2024  8:56 AM EST -----  Regarding: Lab orders needed  This patient will need a Bun and Creatinine done before his CT Colonoscopy on Thursday Feb 15th.  Please let patient know.  Thank you

## 2024-02-06 NOTE — TELEPHONE ENCOUNTER
Called and spoke with pt in regards to getting blood work done prior to 2/15/2024. Pt stated he will go tomorrow to get the blood work done.

## 2024-02-07 ENCOUNTER — TELEPHONE (OUTPATIENT)
Dept: GASTROENTEROLOGY | Facility: CLINIC | Age: 69
End: 2024-02-07

## 2024-02-07 NOTE — TELEPHONE ENCOUNTER
----- Message from Alfredo Jalloh DO sent at 2/6/2024  5:36 PM EST -----  Regarding: FW: Lab orders needed  Masha,    Please call this patient and have him undergo a BMP blood test prior to his CT scan.   Thank you.  ----- Message -----  From: Rosa Isela Roe  Sent: 2/6/2024   8:59 AM EST  To: Alfredo Jalloh DO; #  Subject: Lab orders needed                                This patient will need a Bun and Creatinine done before his CT Colonoscopy on Thursday Feb 15th.  Please let patient know.  Thank you

## 2024-02-08 ENCOUNTER — APPOINTMENT (OUTPATIENT)
Dept: LAB | Facility: CLINIC | Age: 69
End: 2024-02-08
Payer: MEDICARE

## 2024-02-08 DIAGNOSIS — Z12.11 COLON CANCER SCREENING: Primary | ICD-10-CM

## 2024-02-08 DIAGNOSIS — R10.84 GENERALIZED ABDOMINAL PAIN: ICD-10-CM

## 2024-02-08 LAB
ANION GAP SERPL CALCULATED.3IONS-SCNC: 9 MMOL/L
BUN SERPL-MCNC: 14 MG/DL (ref 5–25)
CALCIUM SERPL-MCNC: 8.6 MG/DL (ref 8.4–10.2)
CHLORIDE SERPL-SCNC: 103 MMOL/L (ref 96–108)
CO2 SERPL-SCNC: 24 MMOL/L (ref 21–32)
CREAT SERPL-MCNC: 0.81 MG/DL (ref 0.6–1.3)
GFR SERPL CREATININE-BSD FRML MDRD: 91 ML/MIN/1.73SQ M
GLUCOSE P FAST SERPL-MCNC: 104 MG/DL (ref 65–99)
POTASSIUM SERPL-SCNC: 4 MMOL/L (ref 3.5–5.3)
SODIUM SERPL-SCNC: 136 MMOL/L (ref 135–147)

## 2024-02-08 PROCEDURE — 80048 BASIC METABOLIC PNL TOTAL CA: CPT

## 2024-02-08 PROCEDURE — 36415 COLL VENOUS BLD VENIPUNCTURE: CPT

## 2024-02-15 ENCOUNTER — HOSPITAL ENCOUNTER (OUTPATIENT)
Dept: CT IMAGING | Facility: HOSPITAL | Age: 69
Discharge: HOME | End: 2024-02-15
Attending: INTERNAL MEDICINE

## 2024-02-15 DIAGNOSIS — Z12.11 COLON CANCER SCREENING: ICD-10-CM

## 2024-02-16 ENCOUNTER — HOSPITAL ENCOUNTER (OUTPATIENT)
Dept: CT IMAGING | Facility: HOSPITAL | Age: 69
End: 2024-02-16
Attending: INTERNAL MEDICINE
Payer: MEDICARE

## 2024-02-16 PROCEDURE — G1004 CDSM NDSC: HCPCS

## 2024-02-16 PROCEDURE — 74261 CT COLONOGRAPHY DX: CPT

## 2024-02-22 ENCOUNTER — TELEPHONE (OUTPATIENT)
Age: 69
End: 2024-02-22

## 2024-02-22 NOTE — TELEPHONE ENCOUNTER
Patients GI provider:  Dr. Jalloh  Reason for call:  Alcira from radiology calling with significant findings on the CT colonoscopy

## 2024-07-10 ENCOUNTER — TELEPHONE (OUTPATIENT)
Dept: UROLOGY | Facility: CLINIC | Age: 69
End: 2024-07-10

## 2024-07-10 RX ORDER — FLUTICASONE PROPIONATE 50 MCG
SPRAY, SUSPENSION (ML) NASAL
COMMUNITY
Start: 2024-07-07

## 2024-07-10 RX ORDER — MONTELUKAST SODIUM 10 MG/1
10 TABLET ORAL
COMMUNITY
Start: 2024-05-31

## 2024-07-10 RX ORDER — AZITHROMYCIN 250 MG/1
TABLET, FILM COATED ORAL
COMMUNITY
Start: 2024-05-31

## 2024-07-10 RX ORDER — IPRATROPIUM BROMIDE 21 UG/1
1-2 SPRAY, METERED NASAL 2 TIMES DAILY PRN
COMMUNITY
Start: 2024-06-14

## 2024-07-10 RX ORDER — BETAMETHASONE DIPROPIONATE 0.5 MG/G
CREAM TOPICAL
COMMUNITY
Start: 2024-07-09

## 2024-07-10 RX ORDER — TRIAMCINOLONE ACETONIDE 1 MG/G
CREAM TOPICAL
COMMUNITY
Start: 2024-04-17

## 2024-07-10 NOTE — TELEPHONE ENCOUNTER
Called and spoke to the PT. PSA is needed PTV. PT verbalized understanding and can try to get done tomorrow morning.

## 2024-07-11 ENCOUNTER — APPOINTMENT (OUTPATIENT)
Dept: LAB | Facility: CLINIC | Age: 69
End: 2024-07-11
Payer: MEDICARE

## 2024-07-11 DIAGNOSIS — N32.0 BLADDER-NECK OBSTRUCTION: ICD-10-CM

## 2024-07-11 DIAGNOSIS — Z12.5 PROSTATE CANCER SCREENING: ICD-10-CM

## 2024-07-11 LAB — PSA SERPL-MCNC: 0.93 NG/ML (ref 0–4)

## 2024-07-11 PROCEDURE — 84153 ASSAY OF PSA TOTAL: CPT

## 2024-07-11 PROCEDURE — 36415 COLL VENOUS BLD VENIPUNCTURE: CPT

## 2024-07-12 ENCOUNTER — TELEPHONE (OUTPATIENT)
Dept: UROLOGY | Facility: CLINIC | Age: 69
End: 2024-07-12

## 2024-07-12 ENCOUNTER — OFFICE VISIT (OUTPATIENT)
Dept: UROLOGY | Facility: CLINIC | Age: 69
End: 2024-07-12
Payer: MEDICARE

## 2024-07-12 VITALS
OXYGEN SATURATION: 98 % | HEIGHT: 71 IN | HEART RATE: 80 BPM | SYSTOLIC BLOOD PRESSURE: 124 MMHG | DIASTOLIC BLOOD PRESSURE: 84 MMHG | BODY MASS INDEX: 36.4 KG/M2 | TEMPERATURE: 97.2 F | WEIGHT: 260 LBS

## 2024-07-12 DIAGNOSIS — Z12.5 SCREENING FOR PROSTATE CANCER: ICD-10-CM

## 2024-07-12 DIAGNOSIS — N40.1 BENIGN PROSTATIC HYPERPLASIA WITH LOWER URINARY TRACT SYMPTOMS, SYMPTOM DETAILS UNSPECIFIED: ICD-10-CM

## 2024-07-12 DIAGNOSIS — R39.9 LOWER URINARY TRACT SYMPTOMS (LUTS): Primary | ICD-10-CM

## 2024-07-12 LAB
SL AMB  POCT GLUCOSE, UA: NORMAL
SL AMB LEUKOCYTE ESTERASE,UA: NORMAL
SL AMB POCT BILIRUBIN,UA: NORMAL
SL AMB POCT BLOOD,UA: NORMAL
SL AMB POCT CLARITY,UA: CLEAR
SL AMB POCT COLOR,UA: YELLOW
SL AMB POCT KETONES,UA: NORMAL
SL AMB POCT NITRITE,UA: NORMAL
SL AMB POCT PH,UA: 5
SL AMB POCT SPECIFIC GRAVITY,UA: 1.01
SL AMB POCT URINE PROTEIN: NORMAL
SL AMB POCT UROBILINOGEN: 0.2

## 2024-07-12 PROCEDURE — 99214 OFFICE O/P EST MOD 30 MIN: CPT | Performed by: UROLOGY

## 2024-07-12 PROCEDURE — 81002 URINALYSIS NONAUTO W/O SCOPE: CPT | Performed by: UROLOGY

## 2024-07-12 NOTE — PATIENT INSTRUCTIONS
Continue Flomax daily    You do not need to get your next PSA test until summer of 2025    Next time you have penile pain, try taking over the counter normal dose of NSAID such as ibuprofen

## 2024-07-12 NOTE — PROGRESS NOTES
UROLOGY FOLLOW-UP ENCOUNTER    Raul Otto is a 69 y.o. male with voiding symptoms    Pertinent PMH/PSH: open appy 1982, [facility] around 2017 for pancreatic cancer    ASA81 for prevention    LUTS: hesitancy, nocturia x2    Cysto 10/17/23:  Urethra:                      Normal  Prostate:                    mild lateral lobe hypertrophy, minimal median lobe, no lesions  Bladder:                     Mild trabeculations throughout; no lesions; no stones  Ureteral orifices:       orthotopic  Other findings:          None, retroflexed view confirms    TRUS 10/17/23: prostate 26.52 g    Started on Flomax in October 2023. Lifestyle modifications.    PVR 94 cc on 12/12/23    U dip neg 12/12/23    PSA 0.925 on 7/11/24    Urine dip negative on 7/12/2024    Office 7/12/24: Happy on Flomax. For a few years every few weeks gets fairly sharp pain at right base of penis and it lasts about 1 hour. Pain comes out of nowhere. Not associated with erections or urination, no associated lesion.      Assessment and plan:     BPH/LUTS    Patient has been on Flomax since the last visit.  He is overall happy on the Flomax.  He did state that some days he forgets to take his Flomax.  On these days he does notice that his voiding symptoms are worse.    Overall he is happy to continue the Flomax.    I reviewed his urine dip with him in the office today.  I explained that this was negative for any signs of infection.    Unfortunately, he cannot get bladder scan done in the office today due to poison ivy in the area.    He will continue the Flomax and he will see us in about 6 months for symptom check.    Prostate cancer screening    Patient has been getting yearly PSA testing for prostate cancer screening.  I reviewed his PSA of 0.925 on 7/11/2024.  I explained this was an excellent number.  I explained that we can just check his PSA in about 1 year, in the summer 2025.    Penile pain    Patient has had some abnormal penile pain.  He states that  "for the past 2 years, he will get intermittent somewhat sharp pain at the base of his right penis.  The episodes last for about 1 hour.  They happen every 3 weeks or so.  He cannot think of a specific event that brings them on.  It is not associated with erections.  It is not associated with voiding.  The pain is fairly localized to the base of the penis and does not radiate to testicles or his groin.    I explained to him that did not appear to be an obvious cause for this pain.  His physical exam this area was normal today.  No plaques, no lesions, no palpable masses.    We did talk about the possibility of tight pelvic floor musculature causing referred pain to this area.  I offered him a 2-week course of an anti-inflammatory called diclofenac to prevent any potential inflammation in the area.  He said he preferred to hold off on daily medication, which I think is reasonable.  We therefore agreed on the plan that if he were to have pain, he could take as needed NSAIDs.    He will see me in about 6 months.  If he still having pain at that time, we can consider referral to pelvic floor physical therapy to assess for pelvic musculature tightness.          PLAN  -Next PSA summer 2025  -Continue Flomax  -PRN NSAIDs for penile pain  -RTC in around 6 months for symptom check regarding LUTS and the intermittent penile pain  -If still having recurrent penile pain by the next visit, consider referral to pelvic floor physical therapy        In our clinic encounter today we:  -Reviewed the following in-office tests: Urine dip  -Reviewed the following outpatient blood/urine tests: PSA  -I ordered the following outpatient blood/urine tests: PSA  -Managed the following medications: Continue Flomax          Portions of the above record have been created with voice recognition software.  Occasional wrong word or \"sound alike\" substitution may have occurred due to the inherent limitations of voice recognition software.  Read the " chart carefully and recognize, using context, where substitution may have occurred.      Cliff Suresh DO        Chief Complaint     BPH/LUTS      History of Present Illness     See summary above    No fevers or chills          The following portions of the patient's history were reviewed and updated as appropriate: allergies, current medications, past family history, past medical history, past social history, past surgical history and problem list.        AUA SYMPTOM SCORE      Flowsheet Row Most Recent Value   AUA SYMPTOM SCORE    How often have you had a sensation of not emptying your bladder completely after you finished urinating? 4 (P)     How often have you had to urinate again less than two hours after you finished urinating? 4 (P)     How often have you found you stopped and started again several times when you urinate? 4 (P)     How often have you found it difficult to postpone urination? 4 (P)     How often have you had a weak urinary stream? 4 (P)     How often have you had to push or strain to begin urination? 4 (P)     How many times did you most typically get up to urinate from the time you went to bed at night until the time you got up in the morning? 1 (P)     Quality of Life: If you were to spend the rest of your life with your urinary condition just the way it is now, how would you feel about that? 4 (P)     AUA SYMPTOM SCORE 25 (P)             Review of Systems     Negative for fevers, chills, nausea, vomiting, shortness of breath, abdominal pain, blood in urine, painful urination    Allergies     Allergies   Allergen Reactions    Iodine - Food Allergy Hives    Shellfish-Derived Products - Food Allergy Hives and Anaphylaxis    Glucosamine Hives    Penicillins Other (See Comments)     Rt. Shoulder was frozen    Iodinated Contrast Media Hives and Itching       Physical Exam     No acute distress, normocephalic atraumatic, no CVA tenderness bilaterally, abdomen soft and  "nontender  Circumcised  Bilateral testis soft and nontender  Perineum nontender, no palpable plaque, lesions, or masses in the penile shaft.  Nontender penile shaft.      Vital Signs  Vitals:    07/12/24 0758   BP: 124/84   Pulse: 80   Temp: (!) 97.2 °F (36.2 °C)   TempSrc: Temporal   SpO2: 98%   Weight: 118 kg (260 lb)   Height: 5' 11\" (1.803 m)         Current Medications       Current Outpatient Medications:     ALPRAZolam (XANAX) 0.5 mg tablet, as needed, Disp: , Rfl:     aspirin 81 MG tablet, Take 81 mg by mouth daily, Disp: , Rfl:     azithromycin (ZITHROMAX) 250 mg tablet, TAKE 2 TABLETS BY MOUTH TODAY, THEN TAKE 1 TABLET DAILY FOR 4 DAYS AS DIRECTED, Disp: , Rfl:     betamethasone, augmented, (DIPROLENE-AF) 0.05 % cream, , Disp: , Rfl:     BIOTIN PO, Take by mouth, Disp: , Rfl:     cetirizine (ZyrTEC) 10 mg tablet, Take 10 mg by mouth daily, Disp: , Rfl:     doxycycline hyclate (VIBRA-TABS) 100 mg tablet, TAKE 1 TABLET BY MOUTH TWICE A DAY FOR 7 DAYS, Disp: , Rfl:     FLUoxetine (PROzac) 40 MG capsule, Take 40 mg by mouth daily, Disp: , Rfl:     fluticasone (FLONASE) 50 mcg/act nasal spray, , Disp: , Rfl:     hydrocortisone 2.5 % cream, hydrocortisone 2.5 % topical cream, Disp: , Rfl:     ipratropium (ATROVENT) 0.03 % nasal spray, 1-2 sprays into each nostril 2 (two) times a day as needed, Disp: , Rfl:     ketoconazole (NIZORAL) 2 % shampoo, ketoconazole 2 % shampoo, Disp: , Rfl:     losartan (COZAAR) 50 mg tablet, Take 50 mg by mouth, Disp: , Rfl:     metroNIDAZOLE (METROCREAM) 0.75 % cream, APPLY TO RASH ON FACE TWICE A DAY, Disp: , Rfl:     montelukast (SINGULAIR) 10 mg tablet, Take 10 mg by mouth, Disp: , Rfl:     multivitamin (THERAGRAN) TABS, Take 1 tablet by mouth daily, Disp: , Rfl:     pancrelipase, Lip-Prot-Amyl, (CREON) 24,000 units, TAKE 2 CAPSULES BY MOUTH 3 (THREE) TIMES A DAY WITH MEALS., Disp: , Rfl:     promethazine-dextromethorphan (PHENERGAN-DM) 6.25-15 mg/5 mL oral syrup, Take 5 mL by " mouth 4 (four) times a day as needed, Disp: , Rfl:     tamsulosin (FLOMAX) 0.4 mg, Take 1 capsule (0.4 mg total) by mouth daily with dinner, Disp: 60 capsule, Rfl: 5    triamcinolone (KENALOG) 0.1 % cream, PLEASE SEE ATTACHED FOR DETAILED DIRECTIONS, Disp: , Rfl:     benzonatate (TESSALON) 200 MG capsule, TAKE 1 CAPSULE BY MOUTH THREE TIMES A DAY AS NEEDED FOR COUGH (Patient not taking: Reported on 1/23/2024), Disp: , Rfl:     Promethazine-Phenyleph-Codeine 6.25-5-10 MG/5ML SYRP, Take by mouth every 6 (six) hours as needed (Patient not taking: Reported on 1/23/2024), Disp: , Rfl:       Active Problems     Patient Active Problem List   Diagnosis    Erectile dysfunction due to arterial insufficiency    Benign localized prostatic hyperplasia with lower urinary tract symptoms (LUTS)    Person consulting for explanation of examination or test finding    Morbid obesity due to excess calories (HCC)    Essential hypertension    Basal cell carcinoma (BCC) of [place]    Erectile dysfunction    Chronic left hip pain    Greater trochanteric pain syndrome    Knee pain    Trochanteric bursitis    Low back pain    Mixed hyperlipidemia         Past Medical History     Past Medical History:   Diagnosis Date    Hypertension     Squamous cell skin cancer 09/19/2022    left lateral neck         Surgical History     Past Surgical History:   Procedure Laterality Date    ABDOMINAL SURGERY      APPENDECTOMY      COLONOSCOPY      2018    FACIAL RECONSTRUCTION SURGERY      MOHS SURGERY  10/12/2022    left lateral neck . Dr. Madrigal    OTHER SURGICAL HISTORY      skin cancer removal    TONSILLECTOMY      WHIPPLE PROCEDURE W/ LAPAROSCOPY           Family History     Family History   Problem Relation Age of Onset    Hypertension Mother     Heart disease Mother     Osteoarthritis Mother     Cancer Father     Throat cancer Father          Social History     Social History     Social History     Tobacco Use   Smoking Status Former    Passive  exposure: Past   Smokeless Tobacco Never   Tobacco Comments    quit 25 years ago         Pertinent Lab Values     Lab Results   Component Value Date    CREATININE 0.81 02/08/2024       Lab Results   Component Value Date    PSA 0.925 07/11/2024    PSA 0.90 08/11/2023    PSA 0.8 09/07/2022         Pertinent Imaging     N/A    Pertinent Pathology     N/A

## 2024-07-12 NOTE — TELEPHONE ENCOUNTER
[facility]  ONCE SCHEDULE OPENS

## 2024-08-08 ENCOUNTER — TELEPHONE (OUTPATIENT)
Age: 69
End: 2024-08-08

## 2024-08-08 DIAGNOSIS — N52.01 ERECTILE DYSFUNCTION DUE TO ARTERIAL INSUFFICIENCY: Primary | ICD-10-CM

## 2024-08-08 RX ORDER — SILDENAFIL CITRATE 20 MG/1
20 TABLET ORAL AS NEEDED
Qty: 30 TABLET | Refills: 2 | Status: SHIPPED | OUTPATIENT
Start: 2024-08-08

## 2024-08-08 NOTE — TELEPHONE ENCOUNTER
PA for SILDENAFIL SUBMITTED     via    []CMM-KEY   [x][employer]-Case ID # [id number]     []Faxed to plan   []Other website   []Phone call Case ID #     Office notes sent, clinical questions answered. Awaiting determination    Turnaround time for your insurance to make a decision on your Prior Authorization can take 7-21 business days.

## 2024-08-16 NOTE — TELEPHONE ENCOUNTER
Patient has Medicare and secondary insurance. He has been paying out of pocket for Sildenafil. He was given the information for the Good RX card. He has not tried that approach. He was paying $1-2 per pill. Is there an alternative or a solution to have him obtain his medication that is more cost effective and he is not paying out of pocket.

## 2024-08-16 NOTE — TELEPHONE ENCOUNTER
Pt calling in regards to refill request he states he had been paying out of pocket previously, please contact him what needs to be done to obtain.

## 2024-10-14 DIAGNOSIS — R39.9 LOWER URINARY TRACT SYMPTOMS (LUTS): ICD-10-CM

## 2024-10-14 RX ORDER — TAMSULOSIN HYDROCHLORIDE 0.4 MG/1
0.4 CAPSULE ORAL
Qty: 90 CAPSULE | Refills: 1 | Status: SHIPPED | OUTPATIENT
Start: 2024-10-14

## 2025-01-14 ENCOUNTER — APPOINTMENT (OUTPATIENT)
Dept: LAB | Facility: CLINIC | Age: 70
End: 2025-01-14
Payer: MEDICARE

## 2025-01-14 DIAGNOSIS — N40.1 BENIGN PROSTATIC HYPERPLASIA WITH LOWER URINARY TRACT SYMPTOMS, SYMPTOM DETAILS UNSPECIFIED: ICD-10-CM

## 2025-01-14 DIAGNOSIS — Z12.5 SCREENING FOR PROSTATE CANCER: ICD-10-CM

## 2025-01-14 LAB — PSA SERPL-MCNC: 0.91 NG/ML (ref 0–4)

## 2025-01-14 PROCEDURE — 84153 ASSAY OF PSA TOTAL: CPT

## 2025-01-14 PROCEDURE — 36415 COLL VENOUS BLD VENIPUNCTURE: CPT

## 2025-01-17 NOTE — PROGRESS NOTES
UROLOGY FOLLOW-UP ENCOUNTER    Raul Otto is a 69 y.o. male with voiding symptoms    Pertinent PMH/PSH: open appy 1982, [facility] around 2017 for pancreatic cancer    ASA81 for prevention    LUTS: hesitancy, nocturia x2    Cysto 10/17/23:  Urethra:                      Normal  Prostate:                    mild lateral lobe hypertrophy, minimal median lobe, no lesions  Bladder:                     Mild trabeculations throughout; no lesions; no stones  Ureteral orifices:       orthotopic  Other findings:          None, retroflexed view confirms    TRUS 10/17/23: prostate 26.52 g    Started on Flomax in October 2023. Lifestyle modifications.    PVR 94 cc on 12/12/23    U dip neg 12/12/23    PSA 0.925 on 7/11/24    Urine dip negative on 7/12/2024    Office 7/12/24: Happy on Flomax. For a few years every few weeks gets fairly sharp pain at right base of penis and it lasts about 1 hour. Pain comes out of nowhere. Not associated with erections or urination, no associated lesion.    PSA 0.909 on 1/14/25    U dip neg on 1/21/25    Office 1/21/25: Happy on Flomax.  Denies straining.  Denies nocturia.  Penile pain is resolved.      Assessment and plan:     BPH/LUTS    Patient following with BPH.  He is on Flomax.  He is tolerating this without side effects.    On the Flomax, he has good flow.  Denies straining.  He is sleeping through the night.    He is happy to continue the Flomax.  He will continue the medication and see us in 1 year for symptom check.    Reviewed with the patient that his urine dip today was negative for infection.    Prostate cancer screening    Reviewed with the patient that his PSA was 0.909 on 1/14/2025.  Revealed that this is an excellent level.  He can continue his yearly PSA surveillance.  He will come back in 1 year with PSA.    Penile pain    Last time I saw him in the office, he admitted to intermittent penile pain.  He was taking as needed NSAIDs for this.  This pain dissipated about a month  "after I saw him.  He has not had any issues since then.    He understands if the pain comes back, we may need to consider referral to pelvic floor physical therapy, however, since he is not having any pain, we do not need referral at this time.    Obesity    Added for HCC purposes. Following with PCP.        PLAN  -Next PSA early 2026  -Happy with voiding. Will continue Flomax.  -Penile pain resolved  -AP visit in about 1 year with PSA          In our clinic encounter today we:  -Reviewed the following in-office tests: Urine dip  -Reviewed the following outpatient blood/urine tests: PSA  -I ordered the following outpatient blood/urine tests: PSA  -Managed the following medications: Continue Flomax          Portions of the above record have been created with voice recognition software.  Occasional wrong word or \"sound alike\" substitution may have occurred due to the inherent limitations of voice recognition software.  Read the chart carefully and recognize, using context, where substitution may have occurred.      Cliff Suresh, DO        Chief Complaint     BPH/LUTS      History of Present Illness     See summary above    No fevers or chills          The following portions of the patient's history were reviewed and updated as appropriate: allergies, current medications, past family history, past medical history, past social history, past surgical history and problem list.        AUA SYMPTOM SCORE      Flowsheet Row Most Recent Value   AUA SYMPTOM SCORE    How often have you had a sensation of not emptying your bladder completely after you finished urinating? 4 (P)     How often have you had to urinate again less than two hours after you finished urinating? 4 (P)     How often have you found you stopped and started again several times when you urinate? 4 (P)     How often have you found it difficult to postpone urination? 4 (P)     How often have you had a weak urinary stream? 4 (P)     How often have you had " "to push or strain to begin urination? 4 (P)     How many times did you most typically get up to urinate from the time you went to bed at night until the time you got up in the morning? 1 (P)     Quality of Life: If you were to spend the rest of your life with your urinary condition just the way it is now, how would you feel about that? 4 (P)     AUA SYMPTOM SCORE 25 (P)             Review of Systems     Denied fevers, chills, nausea, vomiting, shortness of breath, abdominal pain, blood in urine, painful urination    Allergies     Allergies   Allergen Reactions    Iodine - Food Allergy Hives    Shellfish-Derived Products - Food Allergy Hives and Anaphylaxis    Glucosamine Hives    Penicillins Other (See Comments)     Rt. Shoulder was frozen    Iodinated Contrast Media Hives and Itching       Physical Exam     No acute distress, normocephalic atraumatic, Rosi soft nontender, no CVA tenderness bilaterally, nonlabored breathing    Vital Signs  Vitals:    01/21/25 0914   BP: 136/64   Patient Position: Sitting   Cuff Size: Standard   Pulse: 77   Temp: 97.5 °F (36.4 °C)   TempSrc: Temporal   SpO2: 98%   Weight: 122 kg (268 lb)   Height: 5' 11\" (1.803 m)         Current Medications       Current Outpatient Medications:     ALPRAZolam (XANAX) 0.5 mg tablet, as needed, Disp: , Rfl:     aspirin 81 MG tablet, Take 81 mg by mouth daily, Disp: , Rfl:     azithromycin (ZITHROMAX) 250 mg tablet, TAKE 2 TABLETS BY MOUTH TODAY, THEN TAKE 1 TABLET DAILY FOR 4 DAYS AS DIRECTED, Disp: , Rfl:     betamethasone, augmented, (DIPROLENE-AF) 0.05 % cream, , Disp: , Rfl:     BIOTIN PO, Take by mouth, Disp: , Rfl:     cetirizine (ZyrTEC) 10 mg tablet, Take 10 mg by mouth daily, Disp: , Rfl:     doxycycline hyclate (VIBRA-TABS) 100 mg tablet, TAKE 1 TABLET BY MOUTH TWICE A DAY FOR 7 DAYS, Disp: , Rfl:     FLUoxetine (PROzac) 40 MG capsule, Take 40 mg by mouth daily, Disp: , Rfl:     fluticasone (FLONASE) 50 mcg/act nasal spray, , Disp: , Rfl: "     hydrocortisone 2.5 % cream, hydrocortisone 2.5 % topical cream, Disp: , Rfl:     ipratropium (ATROVENT) 0.03 % nasal spray, 1-2 sprays into each nostril 2 (two) times a day as needed, Disp: , Rfl:     ketoconazole (NIZORAL) 2 % shampoo, ketoconazole 2 % shampoo, Disp: , Rfl:     losartan (COZAAR) 50 mg tablet, Take 50 mg by mouth, Disp: , Rfl:     metroNIDAZOLE (METROCREAM) 0.75 % cream, APPLY TO RASH ON FACE TWICE A DAY, Disp: , Rfl:     montelukast (SINGULAIR) 10 mg tablet, Take 10 mg by mouth, Disp: , Rfl:     multivitamin (THERAGRAN) TABS, Take 1 tablet by mouth daily, Disp: , Rfl:     pancrelipase, Lip-Prot-Amyl, (CREON) 24,000 units, TAKE 2 CAPSULES BY MOUTH 3 (THREE) TIMES A DAY WITH MEALS., Disp: , Rfl:     promethazine-dextromethorphan (PHENERGAN-DM) 6.25-15 mg/5 mL oral syrup, Take 5 mL by mouth 4 (four) times a day as needed, Disp: , Rfl:     sildenafil (REVATIO) 20 mg tablet, Take 1 tablet (20 mg total) by mouth if needed (erectile dysfunction), Disp: 30 tablet, Rfl: 2    tamsulosin (FLOMAX) 0.4 mg, Take 1 capsule (0.4 mg total) by mouth daily with dinner, Disp: 90 capsule, Rfl: 1    triamcinolone (KENALOG) 0.1 % cream, PLEASE SEE ATTACHED FOR DETAILED DIRECTIONS, Disp: , Rfl:     benzonatate (TESSALON) 200 MG capsule, TAKE 1 CAPSULE BY MOUTH THREE TIMES A DAY AS NEEDED FOR COUGH (Patient not taking: Reported on 1/23/2024), Disp: , Rfl:     Promethazine-Phenyleph-Codeine 6.25-5-10 MG/5ML SYRP, Take by mouth every 6 (six) hours as needed (Patient not taking: Reported on 1/23/2024), Disp: , Rfl:       Active Problems     Patient Active Problem List   Diagnosis    Erectile dysfunction due to arterial insufficiency    Benign localized prostatic hyperplasia with lower urinary tract symptoms (LUTS)    Person consulting for explanation of examination or test finding    Morbid obesity due to excess calories (HCC)    Essential hypertension    Basal cell carcinoma (BCC) of [place]    Erectile dysfunction     Chronic left hip pain    Greater trochanteric pain syndrome    Knee pain    Trochanteric bursitis    Low back pain    Mixed hyperlipidemia         Past Medical History     Past Medical History:   Diagnosis Date    Hypertension     Squamous cell skin cancer 09/19/2022    left lateral neck         Surgical History     Past Surgical History:   Procedure Laterality Date    ABDOMINAL SURGERY      APPENDECTOMY      COLONOSCOPY      2018    FACIAL RECONSTRUCTION SURGERY      MOHS SURGERY  10/12/2022    left lateral neck . Dr. Madrigal    OTHER SURGICAL HISTORY      skin cancer removal    TONSILLECTOMY      WHIPPLE PROCEDURE W/ LAPAROSCOPY           Family History     Family History   Problem Relation Age of Onset    Hypertension Mother     Heart disease Mother     Osteoarthritis Mother     Cancer Father     Throat cancer Father          Social History     Social History     Social History     Tobacco Use   Smoking Status Former    Passive exposure: Past   Smokeless Tobacco Never   Tobacco Comments    quit 25 years ago         Pertinent Lab Values     Lab Results   Component Value Date    CREATININE 0.81 02/08/2024       Lab Results   Component Value Date    PSA 0.909 01/14/2025    PSA 0.925 07/11/2024    PSA 0.90 08/11/2023         Pertinent Imaging     N/A    Pertinent Pathology     N/A     Number Of Hemigard Strips Per Side: 1

## 2025-01-21 ENCOUNTER — OFFICE VISIT (OUTPATIENT)
Dept: UROLOGY | Facility: CLINIC | Age: 70
End: 2025-01-21
Payer: MEDICARE

## 2025-01-21 VITALS
TEMPERATURE: 97.5 F | WEIGHT: 268 LBS | HEIGHT: 71 IN | OXYGEN SATURATION: 98 % | BODY MASS INDEX: 37.52 KG/M2 | DIASTOLIC BLOOD PRESSURE: 64 MMHG | HEART RATE: 77 BPM | SYSTOLIC BLOOD PRESSURE: 136 MMHG

## 2025-01-21 DIAGNOSIS — E66.01 MORBID OBESITY DUE TO EXCESS CALORIES (HCC): ICD-10-CM

## 2025-01-21 DIAGNOSIS — Z12.5 SCREENING FOR PROSTATE CANCER: ICD-10-CM

## 2025-01-21 DIAGNOSIS — R39.9 LOWER URINARY TRACT SYMPTOMS (LUTS): Primary | ICD-10-CM

## 2025-01-21 DIAGNOSIS — N32.0 BLADDER-NECK OBSTRUCTION: ICD-10-CM

## 2025-01-21 LAB
SL AMB  POCT GLUCOSE, UA: NORMAL
SL AMB LEUKOCYTE ESTERASE,UA: NORMAL
SL AMB POCT BILIRUBIN,UA: NORMAL
SL AMB POCT BLOOD,UA: NORMAL
SL AMB POCT CLARITY,UA: CLEAR
SL AMB POCT COLOR,UA: YELLOW
SL AMB POCT KETONES,UA: NORMAL
SL AMB POCT NITRITE,UA: NORMAL
SL AMB POCT PH,UA: 5
SL AMB POCT SPECIFIC GRAVITY,UA: 1
SL AMB POCT URINE PROTEIN: NORMAL
SL AMB POCT UROBILINOGEN: 0.2

## 2025-01-21 PROCEDURE — 99214 OFFICE O/P EST MOD 30 MIN: CPT | Performed by: UROLOGY

## 2025-01-21 PROCEDURE — 81002 URINALYSIS NONAUTO W/O SCOPE: CPT | Performed by: UROLOGY

## 2025-02-08 DIAGNOSIS — Z00.6 ENCOUNTER FOR EXAMINATION FOR NORMAL COMPARISON OR CONTROL IN CLINICAL RESEARCH PROGRAM: ICD-10-CM

## 2025-02-11 ENCOUNTER — APPOINTMENT (OUTPATIENT)
Dept: LAB | Facility: CLINIC | Age: 70
End: 2025-02-11

## 2025-02-11 DIAGNOSIS — Z00.6 ENCOUNTER FOR EXAMINATION FOR NORMAL COMPARISON OR CONTROL IN CLINICAL RESEARCH PROGRAM: ICD-10-CM

## 2025-02-11 PROCEDURE — 36415 COLL VENOUS BLD VENIPUNCTURE: CPT

## 2025-02-26 LAB
APOB+LDLR+PCSK9 GENE MUT ANL BLD/T: NOT DETECTED
BRCA1+BRCA2 DEL+DUP + FULL MUT ANL BLD/T: NOT DETECTED
MLH1+MSH2+MSH6+PMS2 GN DEL+DUP+FUL M: NOT DETECTED

## 2025-04-08 DIAGNOSIS — R39.9 LOWER URINARY TRACT SYMPTOMS (LUTS): ICD-10-CM

## 2025-04-08 RX ORDER — TAMSULOSIN HYDROCHLORIDE 0.4 MG/1
0.4 CAPSULE ORAL
Qty: 90 CAPSULE | Refills: 1 | Status: SHIPPED | OUTPATIENT
Start: 2025-04-08